# Patient Record
Sex: FEMALE | Race: WHITE | NOT HISPANIC OR LATINO | Employment: FULL TIME | URBAN - METROPOLITAN AREA
[De-identification: names, ages, dates, MRNs, and addresses within clinical notes are randomized per-mention and may not be internally consistent; named-entity substitution may affect disease eponyms.]

---

## 2023-06-14 LAB
CHLAMYDIA TRACH., AMPLIFIED: NEGATIVE
N. GONORRHEA, AMPLIFIED: NEGATIVE
TRICHOMONAS VAGINALIS: NEGATIVE

## 2023-11-22 ENCOUNTER — APPOINTMENT (OUTPATIENT)
Dept: PRIMARY CARE | Facility: CLINIC | Age: 38
End: 2023-11-22
Payer: COMMERCIAL

## 2023-12-06 ENCOUNTER — LAB (OUTPATIENT)
Dept: LAB | Facility: LAB | Age: 38
End: 2023-12-06
Payer: COMMERCIAL

## 2023-12-06 ENCOUNTER — OFFICE VISIT (OUTPATIENT)
Dept: PRIMARY CARE | Facility: CLINIC | Age: 38
End: 2023-12-06
Payer: COMMERCIAL

## 2023-12-06 VITALS
WEIGHT: 168 LBS | SYSTOLIC BLOOD PRESSURE: 124 MMHG | HEIGHT: 60 IN | RESPIRATION RATE: 16 BRPM | TEMPERATURE: 97.5 F | OXYGEN SATURATION: 98 % | HEART RATE: 90 BPM | BODY MASS INDEX: 32.98 KG/M2 | DIASTOLIC BLOOD PRESSURE: 76 MMHG

## 2023-12-06 DIAGNOSIS — Z12.31 ENCOUNTER FOR SCREENING MAMMOGRAM FOR MALIGNANT NEOPLASM OF BREAST: ICD-10-CM

## 2023-12-06 DIAGNOSIS — Z00.00 ROUTINE GENERAL MEDICAL EXAMINATION AT A HEALTH CARE FACILITY: ICD-10-CM

## 2023-12-06 DIAGNOSIS — K92.1 HEMATOCHEZIA: ICD-10-CM

## 2023-12-06 DIAGNOSIS — K92.1 HEMATOCHEZIA: Primary | ICD-10-CM

## 2023-12-06 LAB
ALBUMIN SERPL BCP-MCNC: 4.3 G/DL (ref 3.4–5)
ALP SERPL-CCNC: 87 U/L (ref 33–110)
ALT SERPL W P-5'-P-CCNC: 17 U/L (ref 7–45)
ANION GAP SERPL CALC-SCNC: 12 MMOL/L (ref 10–20)
AST SERPL W P-5'-P-CCNC: 18 U/L (ref 9–39)
BASOPHILS # BLD AUTO: 0.06 X10*3/UL (ref 0–0.1)
BASOPHILS NFR BLD AUTO: 0.5 %
BILIRUB SERPL-MCNC: 0.5 MG/DL (ref 0–1.2)
BUN SERPL-MCNC: 10 MG/DL (ref 6–23)
CALCIUM SERPL-MCNC: 9.7 MG/DL (ref 8.6–10.3)
CHLORIDE SERPL-SCNC: 103 MMOL/L (ref 98–107)
CHOLEST SERPL-MCNC: 170 MG/DL (ref 0–199)
CHOLESTEROL/HDL RATIO: 3.1
CO2 SERPL-SCNC: 26 MMOL/L (ref 21–32)
CREAT SERPL-MCNC: 0.78 MG/DL (ref 0.5–1.05)
EOSINOPHIL # BLD AUTO: 0.13 X10*3/UL (ref 0–0.7)
EOSINOPHIL NFR BLD AUTO: 1.2 %
ERYTHROCYTE [DISTWIDTH] IN BLOOD BY AUTOMATED COUNT: 12.5 % (ref 11.5–14.5)
EST. AVERAGE GLUCOSE BLD GHB EST-MCNC: 105 MG/DL
GFR SERPL CREATININE-BSD FRML MDRD: >90 ML/MIN/1.73M*2
GLUCOSE SERPL-MCNC: 73 MG/DL (ref 74–99)
HBA1C MFR BLD: 5.3 %
HCT VFR BLD AUTO: 43 % (ref 36–46)
HDLC SERPL-MCNC: 54.3 MG/DL
HGB BLD-MCNC: 14.7 G/DL (ref 12–16)
IMM GRANULOCYTES # BLD AUTO: 0.02 X10*3/UL (ref 0–0.7)
IMM GRANULOCYTES NFR BLD AUTO: 0.2 % (ref 0–0.9)
LDLC SERPL CALC-MCNC: 82 MG/DL
LYMPHOCYTES # BLD AUTO: 3.47 X10*3/UL (ref 1.2–4.8)
LYMPHOCYTES NFR BLD AUTO: 31.4 %
MCH RBC QN AUTO: 30.3 PG (ref 26–34)
MCHC RBC AUTO-ENTMCNC: 34.2 G/DL (ref 32–36)
MCV RBC AUTO: 89 FL (ref 80–100)
MONOCYTES # BLD AUTO: 0.83 X10*3/UL (ref 0.1–1)
MONOCYTES NFR BLD AUTO: 7.5 %
NEUTROPHILS # BLD AUTO: 6.54 X10*3/UL (ref 1.2–7.7)
NEUTROPHILS NFR BLD AUTO: 59.2 %
NON HDL CHOLESTEROL: 116 MG/DL (ref 0–149)
NRBC BLD-RTO: 0 /100 WBCS (ref 0–0)
PLATELET # BLD AUTO: 325 X10*3/UL (ref 150–450)
POTASSIUM SERPL-SCNC: 3.9 MMOL/L (ref 3.5–5.3)
PROT SERPL-MCNC: 7.9 G/DL (ref 6.4–8.2)
RBC # BLD AUTO: 4.85 X10*6/UL (ref 4–5.2)
SODIUM SERPL-SCNC: 137 MMOL/L (ref 136–145)
T4 FREE SERPL-MCNC: 0.94 NG/DL (ref 0.61–1.12)
TRIGL SERPL-MCNC: 171 MG/DL (ref 0–149)
TSH SERPL-ACNC: 2.64 MIU/L (ref 0.44–3.98)
VLDL: 34 MG/DL (ref 0–40)
WBC # BLD AUTO: 11.1 X10*3/UL (ref 4.4–11.3)

## 2023-12-06 PROCEDURE — 36415 COLL VENOUS BLD VENIPUNCTURE: CPT

## 2023-12-06 PROCEDURE — 80061 LIPID PANEL: CPT

## 2023-12-06 PROCEDURE — 84443 ASSAY THYROID STIM HORMONE: CPT

## 2023-12-06 PROCEDURE — 85025 COMPLETE CBC W/AUTO DIFF WBC: CPT

## 2023-12-06 PROCEDURE — 83036 HEMOGLOBIN GLYCOSYLATED A1C: CPT

## 2023-12-06 PROCEDURE — 80053 COMPREHEN METABOLIC PANEL: CPT

## 2023-12-06 PROCEDURE — 84439 ASSAY OF FREE THYROXINE: CPT

## 2023-12-06 PROCEDURE — 99396 PREV VISIT EST AGE 40-64: CPT | Performed by: FAMILY MEDICINE

## 2023-12-06 PROCEDURE — 1036F TOBACCO NON-USER: CPT | Performed by: FAMILY MEDICINE

## 2023-12-06 RX ORDER — NORETHINDRONE ACETATE AND ETHINYL ESTRADIOL, ETHINYL ESTRADIOL AND FERROUS FUMARATE 1MG-10(24)
KIT ORAL
COMMUNITY
Start: 2016-04-22

## 2023-12-06 RX ORDER — MULTIVITAMIN WITH IRON
TABLET ORAL
COMMUNITY

## 2023-12-06 NOTE — PROGRESS NOTES
Subjective   Patient ID: Vania Jimenez is a 38 y.o. female who presents for Annual Exam and Hematochezia (Some blood on tissue after BM sometimes).  Covid vax: x 2  Flu: UTD  15 and 12 yo kids     Mammogram: scheduled  Pap: 6/2023 (-)  Lmp: ocp  HPI  There is no problem list on file for this patient.    Just returned from [place]/[facility]-cruise    Past Surgical History:   Procedure Laterality Date    CERVICAL BIOPSY  W/ LOOP ELECTRODE EXCISION  1999       Review of Systems no sz mi or cva    This patient has   NO history of recent Covid nor flu symptoms,  NO Fever nor chills,  NO Chest pain, shortness of breath nor paroxysmal nocturnal dyspnea,  NO Nausea, vomiting, nor diarrhea,  NO Hematochezia nor melena,  NO Dysuria, hematuria, nor new incontinence issues  NO new severe headaches nor neurological complaints,  NO new issues with anxiety nor depression nor new psychiatric complaints,  NO suicidal nor homicidal ideations.     OBJECTIVE:  /76   Pulse 90   Temp 36.4 °C (97.5 °F) (Temporal)   Resp 16   Ht 1.524 m (5')   Wt 76.2 kg (168 lb)   LMP  (LMP Unknown) Comment: ocp  SpO2 98%   BMI 32.81 kg/m²      General:  alert, oriented, no acute distress.  No obvious skin rashes noted.   No gait disturbance noted.    Mood is pleasant, not tearful, no signs of emotional distress.  Not appearing intoxicated or altered.   No voiced delusions,   Normal, appropriate behavior.    HEENT: Normocephalic, atraumatic,   Pupils round, reactive to light  Extraocular motions intact and wnl  Tympanic membranes normal    Neck: no nuchal rigidity  No masses palpable.  No carotid bruits.  No thyromegaly.    Respiratory: Equal breath sounds  No wheezes,    rales,    nor rhonchi  No respiratory distress.    Heart: Regular rate and rhythm, no    murmurs  no rubs/gallops    Abdomen: no masses palpable, nontender, no rebound nor guarding.    Extremities: NO cyanosis noted, no clubbing.   No edema noted.  2+dorsalis pedis  pulses.    Normal-not antalgic, steady gait.  Small hemorrhoid seen no active bleeding no blood in stool today    No visits with results within 3 Month(s) from this visit.   Latest known visit with results is:   [facility] Encounter on 06/13/2023   Component Date Value Ref Range Status    Pathology Report 06/13/2023    Final                    Value:    Accession #: [id number]     Date of Procedure:  6/13/2023       Pathologist: [facility], Cytology  Date Reported: 6/22/2023  Date Received:  6/13/2023  Submitting Physician: SHARAN CORDOVA  Attending Physician: SHARAN CORDOVA                           FINAL CYTOLOGICAL INTERPRETATION        A.  THINPREP PAP CERVICAL:              Specimen adequacy:       SATISFACTORY FOR EVALUATION.       Quality Indicator: Endocervical/transformation zone component is present.       Quality Indicator: Partially obscuring inflammation.              General Categorization:       NEGATIVE FOR INTRAEPITHELIAL LESION OR MALIGNANCY.                            HIGH RISK HPV TEST RESULT:                       HPV GENOTYPE  16                      NEGATIVE       HPV GENOTYPE  18                      NEGATIVE       HPV GENOTYPE  OTHER             NEGATIVE              Reference Range: Negative                  QC review performed at [facility], [address] [place] [id number]    Testing for high-risk (HR) type of human papilloma virus (HPV) is performed by  the [employer] love HPV Test.  The love HPV Test is a qualitative polymerase chain  reaction that amplifies DNA of HPV16, HPV18 and 12 other high-risk HPV types  (31, 33, 35, 39, 45, 51, 52, 56, 58, 59, 66, and 68) associated with cervical  cancer and its precursor lesions.  A positive result indicates the presence of  HPV DNA due to one or more of the 14 genotypes: 16, 18, 31, 33, 35, 39, 45, 51,  52, 56, 58, 59, 66, and 68. Negative results  indicate HPV DNA concentrations  are undetectable or below the pre-set threshold for detection. False negative  results may be associated with unoptimized sampling. A negative HR HPV result  does not exclude the possibility of future cytologic HSIL or underlying CIN2-3  or cancer.    This test is approved for cervical specimens by  the US Food and Drug  Administration. Results of this test should be interpreted in conjunction                           with  the patient’s Pap test results.  Please refer to ASCCP current guidelines for  the use of HPV DNA testing, result interpretation, and patient management.   The performance of this test was verified by the Molecular Diagnostic  Laboratory at [facility]. The lab is  certified under the Clinical Laboratory Amendments of 1988 (CLIA 88) as  qualified to perform high complexity clinical laboratory testing.    This specimen has been analyzed by the SiastoPrep Imaging System ([employer]  an automated imaging and review system, which assists the laboratory in  evaluating cells on ThinPrep Pap tests. Following automated imaging, selected  fields from every slide were reviewed by a cytotechnologist and/or pathologist.      Electronically Signed Out By [facility],  Cytology//LIZY/JUNE   By the signature on this report, the individual or group listed as making the  Final Interpretation/Diagnosis certifies that they have reviewed this                           case.  Diagnostic interpretation performed at [facility] [address] [place] [address]  Educational Note:  Cervical cytology is a screening procedure primarily for squamous cancers and  precursors and has associated false-negative and false-positive results as  evidenced by published data.  Your patient’s test should be interpreted in this  context, together with patient’s history and clinical findings.  Regular  sampling and follow-up of  unexplained clinical signs and symptoms are  recommended to minimize false negative results.         Clinical History  Date of Last Menstrual Period:     5/26/2023    Other Clinical Conditions:  COTEST HPV(Genotype) except for ASC-H, HSIL, Carcinoma - Include HPV Genotype  testing    Clinical Diagnosis History: Screening for malignant neoplasm of cervix -  (Z12.4)   Source of Specimen  A: THINPREP PAP CERVICAL            [facility]  Department of Pathology   [id number] [place] [address]                                      Assessment/Plan     Problem List Items Addressed This Visit    None  Visit Diagnoses       Hematochezia    -  Primary    Relevant Orders    CBC and Auto Differential    Comprehensive Metabolic Panel    Hemoglobin A1C    Lipid Panel    Thyroid Stimulating Hormone    Thyroxine, Free    Referral to Gastroenterology    Routine general medical examination at a health care facility        Relevant Orders    CBC and Auto Differential    Comprehensive Metabolic Panel    Hemoglobin A1C    Lipid Panel    Thyroid Stimulating Hormone    Thyroxine, Free    Encounter for screening mammogram for malignant neoplasm of breast        Relevant Orders    CBC and Auto Differential    Comprehensive Metabolic Panel    Hemoglobin A1C    Lipid Panel    Thyroid Stimulating Hormone    Thyroxine, Free            Offered full gi workup  Will consider-is safest choice-unless recurs  The patient is aware that results will be forthcoming of ALL planned labs and or tests. If no results are received on my chart or by letter within 1 - 3 weeks, the patient is aware they need to call to obtain results, as this is not usual. Also, if any new conditions arise, or current condition worsens, it is understood that sooner appointment should be made or urgent care/convenient care or emergency room treatment should be sought depending on severity. Otherwise follow up for recheck at regular  intervals as we have discussed, at least yearly.  Any recurrence of bleeding-may need er-def needs workup

## 2023-12-13 ENCOUNTER — APPOINTMENT (OUTPATIENT)
Dept: RADIOLOGY | Facility: CLINIC | Age: 38
End: 2023-12-13

## 2023-12-26 ENCOUNTER — TELEPHONE (OUTPATIENT)
Dept: GASTROENTEROLOGY | Facility: CLINIC | Age: 38
End: 2023-12-26
Payer: COMMERCIAL

## 2023-12-27 ENCOUNTER — APPOINTMENT (OUTPATIENT)
Dept: GASTROENTEROLOGY | Facility: CLINIC | Age: 38
End: 2023-12-27
Payer: COMMERCIAL

## 2023-12-27 ENCOUNTER — APPOINTMENT (OUTPATIENT)
Dept: RADIOLOGY | Facility: HOSPITAL | Age: 38
End: 2023-12-27
Payer: COMMERCIAL

## 2024-01-10 ENCOUNTER — APPOINTMENT (OUTPATIENT)
Dept: GASTROENTEROLOGY | Facility: CLINIC | Age: 39
End: 2024-01-10
Payer: COMMERCIAL

## 2024-01-17 ENCOUNTER — APPOINTMENT (OUTPATIENT)
Dept: RADIOLOGY | Facility: HOSPITAL | Age: 39
End: 2024-01-17
Payer: COMMERCIAL

## 2024-01-17 DIAGNOSIS — Z12.31 SCREENING MAMMOGRAM FOR BREAST CANCER: ICD-10-CM

## 2024-02-28 ENCOUNTER — TELEMEDICINE (OUTPATIENT)
Dept: PRIMARY CARE | Facility: CLINIC | Age: 39
End: 2024-02-28
Payer: COMMERCIAL

## 2024-02-28 ENCOUNTER — LAB REQUISITION (OUTPATIENT)
Dept: LAB | Facility: HOSPITAL | Age: 39
End: 2024-02-28
Payer: COMMERCIAL

## 2024-02-28 DIAGNOSIS — R05.1 ACUTE COUGH: ICD-10-CM

## 2024-02-28 DIAGNOSIS — U07.1 COVID: Primary | ICD-10-CM

## 2024-02-28 LAB — SARS-COV-2 RNA RESP QL NAA+PROBE: DETECTED

## 2024-02-28 PROCEDURE — 99441 PR PHYS/QHP TELEPHONE EVALUATION 5-10 MIN: CPT | Performed by: NURSE PRACTITIONER

## 2024-02-28 PROCEDURE — 87635 SARS-COV-2 COVID-19 AMP PRB: CPT

## 2024-02-28 RX ORDER — METHYLPREDNISOLONE 4 MG/1
TABLET ORAL
Qty: 21 TABLET | Refills: 0 | Status: SHIPPED | OUTPATIENT
Start: 2024-02-28 | End: 2024-03-06

## 2024-02-28 ASSESSMENT — ENCOUNTER SYMPTOMS
LOSS OF SENSATION IN FEET: 0
OCCASIONAL FEELINGS OF UNSTEADINESS: 0
SORE THROAT: 1
HEADACHES: 1
DEPRESSION: 0
SINUS PAIN: 0
MYALGIAS: 0
FEVER: 0
WHEEZING: 0
DIZZINESS: 0
CHILLS: 1
COUGH: 1
PALPITATIONS: 0

## 2024-02-28 ASSESSMENT — PATIENT HEALTH QUESTIONNAIRE - PHQ9
2. FEELING DOWN, DEPRESSED OR HOPELESS: NOT AT ALL
1. LITTLE INTEREST OR PLEASURE IN DOING THINGS: NOT AT ALL
SUM OF ALL RESPONSES TO PHQ9 QUESTIONS 1 AND 2: 0

## 2024-02-28 NOTE — PATIENT INSTRUCTIONS
Today, you were seen for Covid. Your COVID PCR test resulted positive today.     COVID INSTRUCTIONS:   When to go to the ER: New onset of shortness of breath, worsening shortness of breath, change in mental status and/or confusion, chest pain, bluish lips or dizziness.    Disinfecting: Make sure to disinfect high touch areas frequently such as tables, doorknobs,  chairs, light switches, remotes, handles, sinks and toilets. Examples of proper disinfectants are:  any Environmental Protection Agency (EPA) registered household disinfectants, diluted bleach  solution and at least 70% alcohol based products.    If COVID test is POSITIVE:  you may return to work or other activities without restrictions in 5 days after your first symptom began AND it has been 1 full day (24 hours) that you have not had a fever.  You should then mask for the following 5 days.     Start using humidifier in your bedroom at night when sleeping. This helps with coughing and congestion. Keep well hydrated along with adequate rest and nutrition. Warm tea with honey is soothing to the throat and helps with coughing. This could also help thin the mucus, reducing the blockage in your sinuses. Elevate your head with an extra pillow while sleeping to prevent mucus from blocking your throat.   You may also do OTC  Robitussin as needed for your cough  Start short burst of steroid as directed  May use Tylenol per package instructions as needed for pain/fever  If symptoms do not improve, see PCP within 1 week, or sooner with any additional concerns.  If you develop worsening symptoms including shortness of breath/trouble breathing, bluish lips or chest pain, proceed to the Emergency department for further treatment

## 2024-02-28 NOTE — PROGRESS NOTES
Subjective   Patient ID: Vania Jimenez is a 38 y.o. female who presents for URI.  Symptoms: coughing, sore throat, SOB, headaches, plugged ear sensation, congestion, sneezing.  Length of symptoms: yesterday  OTC: none  Related information: covid positive today    HPI     38-year-old female presents today complaining of nasal congestion, cough, sore throat, headaches and a plugged ear sensation that started yesterday.  She denies any chest pain or trouble breathing, but does state that her chest feels heavy and she feels mildly short of breath.  She denies smoking or vaping.  No fevers, but was feeling chilled last night.  She denies any history of asthma or heart disease.  Several coworkers have tested positive for COVID.  She has not tried taking anything over-the-counter for symptom relief.  She did take a COVID test today which resulted positive.    Review of Systems   Constitutional:  Positive for chills. Negative for fever.   HENT:  Positive for congestion and sore throat. Negative for ear pain and sinus pain.    Respiratory:  Positive for cough. Negative for wheezing.    Cardiovascular:  Negative for chest pain and palpitations.   Musculoskeletal:  Negative for myalgias.   Skin:  Negative for rash.   Neurological:  Positive for headaches. Negative for dizziness.       Objective   There were no vitals taken for this visit.  No vitals taken with virtual visit-phone encounter only    Physical Exam no physical exam done with virtual visit-phone encounter only.  Patient is able to speak in complete sentences, she does not appear short of breath    Assessment/Plan   Problem List Items Addressed This Visit    None  Visit Diagnoses         Codes    COVID    -  Primary U07.1    Relevant Medications    methylPREDNISolone (Medrol Dospak) 4 mg tablets    Acute cough     R05.1    Relevant Medications    methylPREDNISolone (Medrol Dospak) 4 mg tablets    BMI 32.0-32.9,adult     Z68.32        Positive COVID: Lab from  earlier today reviewed +COVID.   Increase rest and fluids. Medrol dose alethea as directed. Discussed current CDC isolation guidelines.   If developing any worsening symptoms, chest pain, trouble breathing, educated to proceed to emergency department or call 911.

## 2024-03-13 ENCOUNTER — HOSPITAL ENCOUNTER (OUTPATIENT)
Dept: RADIOLOGY | Facility: HOSPITAL | Age: 39
Discharge: HOME | End: 2024-03-13
Payer: COMMERCIAL

## 2024-03-13 VITALS — WEIGHT: 165 LBS | HEIGHT: 60 IN | BODY MASS INDEX: 32.39 KG/M2

## 2024-03-13 DIAGNOSIS — Z12.39 ENCOUNTER FOR OTHER SCREENING FOR MALIGNANT NEOPLASM OF BREAST: ICD-10-CM

## 2024-03-13 PROCEDURE — 77067 SCR MAMMO BI INCL CAD: CPT | Mod: BILATERAL PROCEDURE | Performed by: RADIOLOGY

## 2024-03-13 PROCEDURE — 77063 BREAST TOMOSYNTHESIS BI: CPT | Mod: BILATERAL PROCEDURE | Performed by: RADIOLOGY

## 2024-03-13 PROCEDURE — 77067 SCR MAMMO BI INCL CAD: CPT

## 2024-03-14 ENCOUNTER — TELEPHONE (OUTPATIENT)
Dept: GASTROENTEROLOGY | Facility: CLINIC | Age: 39
End: 2024-03-14
Payer: COMMERCIAL

## 2024-03-14 NOTE — TELEPHONE ENCOUNTER
LVM advising pt that appt on 4/3 will be in [place] d/t template change. Left address for [place] in message if she would like to keep appt, left my direct line if she wished to r/s to [place].

## 2024-03-22 PROBLEM — R53.83 FATIGUE: Status: ACTIVE | Noted: 2024-03-22

## 2024-03-22 PROBLEM — L25.9 CONTACT DERMATITIS: Status: ACTIVE | Noted: 2024-03-22

## 2024-03-22 PROBLEM — N39.0 ACUTE URINARY TRACT INFECTION: Status: ACTIVE | Noted: 2024-03-22

## 2024-03-22 PROBLEM — N90.4 DYSTROPHY OF VULVA: Status: ACTIVE | Noted: 2024-03-22

## 2024-03-22 PROBLEM — N92.6 MISSED PERIOD: Status: ACTIVE | Noted: 2024-03-22

## 2024-03-22 PROBLEM — N93.9 ABNORMAL UTERINE BLEEDING: Status: ACTIVE | Noted: 2024-03-22

## 2024-03-22 PROBLEM — R63.5 ABNORMAL WEIGHT GAIN: Status: ACTIVE | Noted: 2024-03-22

## 2024-03-22 PROBLEM — L73.9 FOLLICULITIS: Status: ACTIVE | Noted: 2024-03-22

## 2024-03-22 PROBLEM — K92.1 HEMATOCHEZIA: Status: ACTIVE | Noted: 2023-12-06

## 2024-03-22 PROBLEM — R39.9 LOWER URINARY TRACT SYMPTOMS: Status: ACTIVE | Noted: 2024-03-22

## 2024-03-22 PROBLEM — S05.02XA ABRASION OF LEFT CORNEA: Status: ACTIVE | Noted: 2024-03-22

## 2024-03-22 PROBLEM — L29.9 ITCHING: Status: ACTIVE | Noted: 2024-03-22

## 2024-03-22 PROBLEM — L29.2 PRURITUS OF VULVA: Status: ACTIVE | Noted: 2024-03-22

## 2024-03-22 PROBLEM — F43.21 ADJUSTMENT DISORDER WITH DEPRESSED MOOD: Status: ACTIVE | Noted: 2024-03-22

## 2024-03-22 PROBLEM — M54.50 LOW BACK PAIN: Status: ACTIVE | Noted: 2024-03-22

## 2024-03-22 PROBLEM — N87.9 CERVICAL ATYPIA: Status: ACTIVE | Noted: 2024-03-22

## 2024-03-22 PROBLEM — M22.2X9 PATELLOFEMORAL PAIN SYNDROME: Status: ACTIVE | Noted: 2024-03-22

## 2024-03-22 PROBLEM — J30.9 ALLERGIC RHINITIS: Status: ACTIVE | Noted: 2024-03-22

## 2024-03-22 PROBLEM — L03.811 CELLULITIS OF HEAD EXCEPT FACE: Status: ACTIVE | Noted: 2024-03-22

## 2024-03-22 PROBLEM — S93.439A SPRAIN OF TIBIOFIBULAR LIGAMENT OF ANKLE: Status: ACTIVE | Noted: 2024-03-22

## 2024-03-22 PROBLEM — B37.31 CANDIDIASIS OF VAGINA: Status: ACTIVE | Noted: 2024-03-22

## 2024-03-22 PROBLEM — N64.9 LESION OF NIPPLE: Status: ACTIVE | Noted: 2024-03-22

## 2024-03-22 PROBLEM — M25.579 ANKLE PAIN: Status: ACTIVE | Noted: 2024-03-22

## 2024-04-03 ENCOUNTER — OFFICE VISIT (OUTPATIENT)
Dept: GASTROENTEROLOGY | Facility: CLINIC | Age: 39
End: 2024-04-03
Payer: COMMERCIAL

## 2024-04-03 VITALS
WEIGHT: 168 LBS | OXYGEN SATURATION: 97 % | HEART RATE: 90 BPM | RESPIRATION RATE: 18 BRPM | SYSTOLIC BLOOD PRESSURE: 126 MMHG | DIASTOLIC BLOOD PRESSURE: 89 MMHG | BODY MASS INDEX: 32.98 KG/M2 | HEIGHT: 60 IN

## 2024-04-03 DIAGNOSIS — K21.9 GASTROESOPHAGEAL REFLUX DISEASE WITHOUT ESOPHAGITIS: ICD-10-CM

## 2024-04-03 DIAGNOSIS — K92.1 HEMATOCHEZIA: ICD-10-CM

## 2024-04-03 DIAGNOSIS — R10.10 UPPER ABDOMINAL PAIN: Primary | ICD-10-CM

## 2024-04-03 PROCEDURE — 99203 OFFICE O/P NEW LOW 30 MIN: CPT | Performed by: NURSE PRACTITIONER

## 2024-04-03 PROCEDURE — 1036F TOBACCO NON-USER: CPT | Performed by: NURSE PRACTITIONER

## 2024-04-03 PROCEDURE — 3008F BODY MASS INDEX DOCD: CPT | Performed by: NURSE PRACTITIONER

## 2024-04-03 NOTE — PROGRESS NOTES
Subjective   Patient ID: Vania Jimenez is a 38 y.o. female who presents for Rectal Bleeding (Noticed some bleeding after she came back from cruise around November/December. Only saw it once, describes as a danika red color in toilet. Unsure if this may be a hemorrhoid, states PCP checked and said there may be a small hemorrhoid. Has never had a colonoscopy. Denies constipation, diarrhea.).  HPI  Patient here for complaints of hematochezia that started after a cruise in November, 2023.  Continues to experience bright red blood with wiping and occasionally in the toilet.  No rectal pain.  Weight and appetite are stable.  No diarrhea or constipation.  No straining and feels fully evacuated.  No NSAIDs.  Occasional alcohol.  No smoking.    No family history of CRC.  Maternal niece with IBD.    Experiences upper abdominal pain after eating only on occasion.  Will experience GERD/indigestion on occasion.  No dysphagia or odynophagia.  -> CBC in December, 2023 within normal limits.  Review of Systems   All other systems reviewed and are negative.      Objective   Physical Exam  Vitals reviewed.   Constitutional:       General: She is awake. She is not in acute distress.     Appearance: Normal appearance. She is well-developed. She is not ill-appearing, toxic-appearing or diaphoretic.   HENT:      Head: Normocephalic and atraumatic.   Eyes:      General: No scleral icterus.     Pupils: Pupils are equal, round, and reactive to light.   Cardiovascular:      Rate and Rhythm: Normal rate and regular rhythm.      Heart sounds: Normal heart sounds. No murmur heard.  Pulmonary:      Effort: Pulmonary effort is normal. No respiratory distress.      Breath sounds: Normal breath sounds.   Abdominal:      General: Bowel sounds are normal.      Palpations: Abdomen is soft. There is no hepatomegaly.      Tenderness: There is no abdominal tenderness. There is no guarding or rebound.      Hernia: No hernia is present.   Musculoskeletal:          General: Normal range of motion.      Cervical back: Normal range of motion.      Right lower leg: No edema.      Left lower leg: No edema.   Skin:     General: Skin is warm and dry.      Coloration: Skin is not jaundiced or pale.   Neurological:      General: No focal deficit present.      Mental Status: She is alert and oriented to person, place, and time.      Motor: No weakness.      Gait: Gait normal.   Psychiatric:         Mood and Affect: Mood normal.         Behavior: Behavior is cooperative.         Thought Content: Thought content normal.         Judgment: Judgment normal.         Assessment/Plan   Diagnoses and all orders for this visit:  Upper abdominal pain  Experiences upper abdominal pain after eating only on occasion, she can trial Pepcid or FD guard as needed.  Monitor symptoms, report any changes or worsening of symptoms.  Hematochezia  -     Colonoscopy Diagnostic; the procedure was discussed at length, including all possible risks.  Patient to follow-up after her procedure to review results.  DDx: Internal hemorrhoids, bleeding polyp, proctitis, ischemic colitis, viral?  Gastroesophageal reflux disease without esophagitis  -Antireflux lifestyle recommended  -Trial Pepcid as needed         ARIEL Meyers-CNP 04/03/24 11:04 AM

## 2024-04-03 NOTE — PATIENT INSTRUCTIONS
Thank you for seeing us in clinic today!     In summary, please do the following:  We will schedule you for your Colonoscopy   .      We will see you again after your procedure.    If you have any questions or concerns, please call the office at [phone]

## 2024-05-08 ENCOUNTER — ANESTHESIA EVENT (OUTPATIENT)
Dept: GASTROENTEROLOGY | Facility: EXTERNAL LOCATION | Age: 39
End: 2024-05-08

## 2024-05-15 ENCOUNTER — ANESTHESIA (OUTPATIENT)
Dept: GASTROENTEROLOGY | Facility: EXTERNAL LOCATION | Age: 39
End: 2024-05-15

## 2024-05-15 ENCOUNTER — HOSPITAL ENCOUNTER (OUTPATIENT)
Dept: GASTROENTEROLOGY | Facility: EXTERNAL LOCATION | Age: 39
Discharge: HOME | End: 2024-05-15
Payer: COMMERCIAL

## 2024-05-15 VITALS
HEIGHT: 60 IN | DIASTOLIC BLOOD PRESSURE: 83 MMHG | OXYGEN SATURATION: 98 % | SYSTOLIC BLOOD PRESSURE: 111 MMHG | TEMPERATURE: 97.3 F | WEIGHT: 165 LBS | BODY MASS INDEX: 32.39 KG/M2 | HEART RATE: 66 BPM | RESPIRATION RATE: 11 BRPM

## 2024-05-15 DIAGNOSIS — K92.1 HEMATOCHEZIA: ICD-10-CM

## 2024-05-15 LAB — PREGNANCY TEST URINE, POC: NEGATIVE

## 2024-05-15 PROCEDURE — 81025 URINE PREGNANCY TEST: CPT | Performed by: STUDENT IN AN ORGANIZED HEALTH CARE EDUCATION/TRAINING PROGRAM

## 2024-05-15 PROCEDURE — 45385 COLONOSCOPY W/LESION REMOVAL: CPT | Performed by: STUDENT IN AN ORGANIZED HEALTH CARE EDUCATION/TRAINING PROGRAM

## 2024-05-15 PROCEDURE — 88305 TISSUE EXAM BY PATHOLOGIST: CPT

## 2024-05-15 PROCEDURE — 88305 TISSUE EXAM BY PATHOLOGIST: CPT | Performed by: PATHOLOGY

## 2024-05-15 RX ORDER — SODIUM CHLORIDE 9 MG/ML
20 INJECTION, SOLUTION INTRAVENOUS CONTINUOUS
Status: DISCONTINUED | OUTPATIENT
Start: 2024-05-15 | End: 2024-05-16 | Stop reason: HOSPADM

## 2024-05-15 RX ORDER — PROPOFOL 10 MG/ML
INJECTION, EMULSION INTRAVENOUS AS NEEDED
Status: DISCONTINUED | OUTPATIENT
Start: 2024-05-15 | End: 2024-05-15

## 2024-05-15 RX ADMIN — PROPOFOL 50 MG: 10 INJECTION, EMULSION INTRAVENOUS at 09:02

## 2024-05-15 RX ADMIN — SODIUM CHLORIDE: 9 INJECTION, SOLUTION INTRAVENOUS at 08:50

## 2024-05-15 RX ADMIN — PROPOFOL 20 MG: 10 INJECTION, EMULSION INTRAVENOUS at 08:59

## 2024-05-15 RX ADMIN — PROPOFOL 80 MG: 10 INJECTION, EMULSION INTRAVENOUS at 08:56

## 2024-05-15 RX ADMIN — PROPOFOL 100 MG: 10 INJECTION, EMULSION INTRAVENOUS at 08:53

## 2024-05-15 SDOH — HEALTH STABILITY: MENTAL HEALTH: CURRENT SMOKER: 0

## 2024-05-15 ASSESSMENT — PAIN SCALES - GENERAL
PAINLEVEL_OUTOF10: 0 - NO PAIN

## 2024-05-15 ASSESSMENT — COLUMBIA-SUICIDE SEVERITY RATING SCALE - C-SSRS
2. HAVE YOU ACTUALLY HAD ANY THOUGHTS OF KILLING YOURSELF?: NO
6. HAVE YOU EVER DONE ANYTHING, STARTED TO DO ANYTHING, OR PREPARED TO DO ANYTHING TO END YOUR LIFE?: NO
1. IN THE PAST MONTH, HAVE YOU WISHED YOU WERE DEAD OR WISHED YOU COULD GO TO SLEEP AND NOT WAKE UP?: NO

## 2024-05-15 ASSESSMENT — PAIN - FUNCTIONAL ASSESSMENT
PAIN_FUNCTIONAL_ASSESSMENT: 0-10

## 2024-05-15 NOTE — ANESTHESIA PREPROCEDURE EVALUATION
Patient: Vania Jimenez    Procedure Information       Date/Time: 05/15/24 0850    Scheduled providers: Yomi Courtney DO    Procedure: COLONOSCOPY    Location: [facility]            Relevant Problems   Anesthesia   (+) Abrasion of left cornea      /Renal   (+) Acute urinary tract infection   (+) Calculus of kidney      ID   (+) Acute urinary tract infection   (+) Candidiasis of vagina      GYN   (+) Abnormal uterine bleeding       Clinical information reviewed:   Tobacco  Allergies  Meds   Med Hx  Surg Hx  OB Status  Fam Hx  Soc   Hx        NPO Detail:  NPO/Void Status  Date of Last Liquid: 05/15/24  Time of Last Liquid: 0230  Date of Last Solid: 05/14/24  Time of Last Solid: 0700  Last Intake Type: Clear fluids         Physical Exam    Airway  Mallampati: II  TM distance: >3 FB  Neck ROM: full     Cardiovascular - normal exam     Dental - normal exam     Pulmonary - normal exam     Abdominal - normal exam         Anesthesia Plan    History of general anesthesia?: yes  History of complications of general anesthesia?: no    ASA 2     MAC     The patient is not a current smoker.  Patient did not smoke on day of procedure.    intravenous induction   Anesthetic plan and risks discussed with patient.  Use of blood products discussed with patient who consented to blood products.    Plan discussed with CRNA.

## 2024-05-15 NOTE — H&P
Outpatient NR Procedure H&P    Patient Profile  Name Vania Jimenez  Date of Birth 1985  MRN [id number]  PCP Nohelia Lombardi        Diagnosis: Rectal bleeding.  Procedure(s):  Colonoscopy.    Allergies  No Known Allergies    Past Medical History   Past Medical History:   Diagnosis Date    Pap smear abnormality of cervix with ASCUS favoring benign 2011    Pap test, as part of routine gynecological examination 06/2023    wnl, hpv neg       Medication Reviewed - yes  Prior to Admission medications    Medication Sig Start Date End Date Taking? Authorizing Provider   Lo Loestrin Fe 1 mg-10 mcg (24)/10 mcg (2) tablet Take by mouth. 4/22/16  Yes Historical Provider, MD   multivitamin (Multiple Vitamins) tablet Take by mouth.   Yes Historical Provider, MD       Physical Exam  Vitals:    05/15/24 0825   BP: (!) 146/96   Pulse: 90   Resp: 12   Temp: 36.7 °C (98.1 °F)   SpO2: 99%      Weight   Vitals:    05/15/24 0825   Weight: 74.8 kg (165 lb)     BMI Body mass index is 32.22 kg/m².    General: A&Ox3, NAD.  HEENT: AT/NC.   CV: RRR. No murmur.  Resp: CTA bilaterally. No wheezing, rhonchi or rales.   GI: Soft, NT/ND.   Extrem: No edema.  Skin: No Jaundice.   Neuro: No focal deficits.   Psych: Normal mood and affect.        Oropharyngeal Classification II (hard and soft palate, upper portion of tonsils and uvula visible)  ASA PS Classification 2  Sedation Plan: Deep Sedation.  Procedure Plan - pre-procedural (re)assesment completed by physician:  discharge/transfer patient when discharge criteria met    Yomi Courtney DO  5/15/2024 8:50 AM

## 2024-05-15 NOTE — DISCHARGE INSTRUCTIONS
Patient Instructions Post Procedure      The anesthetics, sedatives or narcotics which were given to you today will be acting in your body for the next 24 hours, so you might feel a little sleepy or groggy.  This feeling should slowly wear off. Carefully read and follow the instructions.     You received sedation today:  - Do not drive or operate any machinery or power tools of any kind.   - No alcoholic beverages today, not even beer or wine.  - Do not make any important decisions or sign any legal documents.  - No over the counter medications that contain alcohol or that may cause drowsiness.    While it is common to experience mild to moderate abdominal distention, gas, or belching after your procedure, if any of these symptoms occur following discharge from the GI Lab or within one week of having your procedure, call the [facility] to be advised whether a visit to your nearest Urgent Care or Emergency Department is indicated.  Take this paper with you if you go.   - If you develop an allergic reaction to the medications that were given during your procedure such as difficulty breathing, rash, hives, severe nausea, vomiting or lightheadedness.  - If you experience chest pain, shortness of breath, severe abdominal pain, fevers and chills.  -If you develop signs and symptoms of bleeding such as blood in your spit, if your stools turn black, tarry, or bloody  - If you have not urinated within 8 hours following your procedure.  - If your IV site becomes painful, red, inflamed, or looks infected.    If you received a biopsy/polypectomy/sphincterotomy the following instructions apply below:  __ Do not use Aspirin containing products, non-steroidal medications or anti-coagulants for one week following your procedure. (Examples of these types of medications are: Advil, Arthrotec, Aleve, Coumadin, Ecotrin, Heparin, Ibuprofen, Indocin, Motrin, Naprosyn, Nuprin, Plavix, Vioxx, and Voltarin, or their generic  forms.  This list is not all-inclusive.  Check with your physician or pharmacist before resuming medications.)   __ Eat a soft diet today.  Avoid foods that are poorly digested for the next 24 hours.  These foods would include: nuts, beans, lettuce, red meats, and fried foods. Start with liquids and advance your diet as tolerated, gradually work up to eating solids.   __ Do not have a Barium Study or Enema for one week.    Your physician recommends the additional following instructions:    -You have a contact number available for emergencies. The signs and symptoms of potential delayed complications were discussed with you. You may return to normal activities tomorrow.  -Resume your previous diet or other if specified.  -Continue your present medications.   -We are waiting for your pathology results, if applicable.  -The findings and recommendations have been discussed with you and/or family.  - Please see Medication Reconciliation Form for new medication/medications prescribed.     If you experience any problems or have any questions following discharge from the GI Lab, please call: [phone] from 7 am- 4:30 pm.  In the event of an emergency please go to the closest Emergency Department or call Dr. Courtney at [phone]

## 2024-05-15 NOTE — ANESTHESIA POSTPROCEDURE EVALUATION
Patient: Vania Jimenez    Procedure Summary       Date: 05/15/24 Room / Location: [facility]    Anesthesia Start: 0850 Anesthesia Stop: 0912    Procedure: COLONOSCOPY Diagnosis: Hematochezia    Scheduled Providers: Yomi Courtney DO Responsible Provider: RJ Falcon    Anesthesia Type: MAC ASA Status: 2            Anesthesia Type: MAC    Vitals Value Taken Time   /78 05/15/24 0912   Temp 36.3 05/15/24 0912   Pulse 85 05/15/24 0912   Resp 20 05/15/24 0912   SpO2 99 05/15/24 0912       Anesthesia Post Evaluation    Patient location during evaluation: PACU  Patient participation: complete - patient participated  Level of consciousness: awake and alert  Pain management: adequate  Airway patency: patent  Cardiovascular status: acceptable  Respiratory status: acceptable  Hydration status: acceptable  Postoperative Nausea and Vomiting: none        There were no known notable events for this encounter.

## 2024-05-28 LAB
LABORATORY COMMENT REPORT: NORMAL
PATH REPORT.FINAL DX SPEC: NORMAL
PATH REPORT.GROSS SPEC: NORMAL
PATH REPORT.TOTAL CANCER: NORMAL

## 2024-05-29 ENCOUNTER — APPOINTMENT (OUTPATIENT)
Dept: GASTROENTEROLOGY | Facility: CLINIC | Age: 39
End: 2024-05-29
Payer: COMMERCIAL

## 2024-06-11 PROBLEM — N93.9 ABNORMAL UTERINE BLEEDING: Status: RESOLVED | Noted: 2024-03-22 | Resolved: 2024-06-11

## 2024-06-12 ENCOUNTER — APPOINTMENT (OUTPATIENT)
Dept: GASTROENTEROLOGY | Facility: CLINIC | Age: 39
End: 2024-06-12
Payer: COMMERCIAL

## 2024-06-12 DIAGNOSIS — K64.8 INTERNAL HEMORRHOIDS: ICD-10-CM

## 2024-06-12 DIAGNOSIS — K92.1 HEMATOCHEZIA: Primary | ICD-10-CM

## 2024-06-12 DIAGNOSIS — Z83.79 FAMILY HISTORY OF CELIAC DISEASE: ICD-10-CM

## 2024-06-12 DIAGNOSIS — K21.9 GASTROESOPHAGEAL REFLUX DISEASE WITHOUT ESOPHAGITIS: ICD-10-CM

## 2024-06-12 DIAGNOSIS — D12.6 SERRATED ADENOMA OF COLON: ICD-10-CM

## 2024-06-12 PROCEDURE — 3008F BODY MASS INDEX DOCD: CPT | Performed by: NURSE PRACTITIONER

## 2024-06-12 PROCEDURE — 99213 OFFICE O/P EST LOW 20 MIN: CPT | Performed by: NURSE PRACTITIONER

## 2024-06-12 RX ORDER — FAMOTIDINE 40 MG/1
40 TABLET, FILM COATED ORAL NIGHTLY PRN
Qty: 60 TABLET | Refills: 11 | Status: SHIPPED | OUTPATIENT
Start: 2024-06-12 | End: 2024-07-12

## 2024-06-12 NOTE — PROGRESS NOTES
Subjective   Patient ID: Vania Jimenez is a 38 y.o. female who presents for No chief complaint on file..  HPI  LOV 4/3/24:  Upper abdominal pain  Experiences upper abdominal pain after eating only on occasion, she can trial Pepcid or FD guard as needed.  Monitor symptoms, report any changes or worsening of symptoms.  Hematochezia  -     Colonoscopy Diagnostic; the procedure was discussed at length, including all possible risks.  Patient to follow-up after her procedure to review results.  DDx: Internal hemorrhoids, bleeding polyp, proctitis, ischemic colitis, viral?  Gastroesophageal reflux disease without esophagitis  -Antireflux lifestyle recommended  -Trial Pepcid as needed    -> Colonoscopy 5/15/2024:  The terminal ileum appeared normal.  2 subcentimeter polyps in the ascending colon were removed with cold snare.  Hemorrhoids.   +SSAx2 5-year surveillance      Patient following up after her colonoscopy.  No further episode of rectal bleeding.  She denies constipation or diarrhea.  No abdominal pain.  She experiences GERD, has not tried Pepcid.  No dysphagia or odynophagia.  Requesting celiac genetic testing, her daughter was recently diagnosed with celiac disease.  No abdominal bloating or pain.  Weight and appetite are stable.  She offers no GI complaints today other than GERD.  Review of Systems   All other systems reviewed and are negative.      Objective   Physical Exam  Constitutional:       General: She is awake. She is not in acute distress.     Appearance: Normal appearance. She is well-developed. She is not ill-appearing, toxic-appearing or diaphoretic.   HENT:      Head: Normocephalic and atraumatic.   Eyes:      General: No scleral icterus.     Pupils: Pupils are equal, round, and reactive to light.   Pulmonary:      Effort: Pulmonary effort is normal. No respiratory distress.   Musculoskeletal:         General: Normal range of motion.   Skin:     Coloration: Skin is not cyanotic, jaundiced or pale.    Neurological:      General: No focal deficit present.      Mental Status: She is alert and oriented to person, place, and time.   Psychiatric:         Behavior: Behavior is cooperative.         Assessment/Plan   Diagnoses and all orders for this visit:  Hematochezia  -Resolved, status post colonoscopy detailed in the HPI.  Rectal bleeding likely secondary to hemorrhoid bleeding.  Family history of celiac disease  -     Tissue Transglutaminase IgA; Future  -     HLA Typing Celiac Disease; Future  Serrated adenoma of colon  -Surveillance colonoscopy due in 5 years  Gastroesophageal reflux disease without esophagitis  - Anti-reflux lifestyle modification discussed at length   --Avoid spicy acidic based foods   --Limit coffee, tea   --Limit the amount of food during meal time, avoid gorging   --Avoid bedtime snacks and eat meals 3 to 4 hrs before lying down   --Elevate the head of the bed with blocks   --Weight reduction advised and discussed at length   -Follow-up in 6 to 8 weeks  -     famotidine (Pepcid) 40 mg tablet; Take 1 tablet (40 mg) by mouth as needed at bedtime for heartburn.  Internal hemorrhoids  -Keep stools soft and regular  -Avoid straining         ARIEL Meyers-CNP 06/12/24 12:10 PM

## 2024-06-17 ENCOUNTER — LAB (OUTPATIENT)
Dept: LAB | Facility: LAB | Age: 39
End: 2024-06-17
Payer: COMMERCIAL

## 2024-06-17 DIAGNOSIS — Z83.79 FAMILY HISTORY OF CELIAC DISEASE: ICD-10-CM

## 2024-06-17 LAB — TTG IGA SER IA-ACNC: <1 U/ML

## 2024-06-17 PROCEDURE — 36415 COLL VENOUS BLD VENIPUNCTURE: CPT

## 2024-06-17 PROCEDURE — 81383 HLA II TYPING 1 ALLELE HR: CPT

## 2024-06-17 PROCEDURE — 83516 IMMUNOASSAY NONANTIBODY: CPT

## 2024-06-25 LAB
DQA1*05: NEGATIVE
DQB1*02:01: NEGATIVE
DQB1*02:02: NEGATIVE
DQB1*03:02: NEGATIVE
HLA RESULTS: NORMAL

## 2024-07-08 ENCOUNTER — ALLIED HEALTH (OUTPATIENT)
Dept: INTEGRATIVE MEDICINE | Facility: CLINIC | Age: 39
End: 2024-07-08
Payer: COMMERCIAL

## 2024-07-08 DIAGNOSIS — M99.00 SEGMENTAL AND SOMATIC DYSFUNCTION OF HEAD REGION: ICD-10-CM

## 2024-07-08 DIAGNOSIS — M54.2 NECK PAIN: Primary | ICD-10-CM

## 2024-07-08 DIAGNOSIS — M99.03 SEGMENTAL AND SOMATIC DYSFUNCTION OF LUMBAR REGION: ICD-10-CM

## 2024-07-08 DIAGNOSIS — G89.29 CHRONIC BILATERAL LOW BACK PAIN WITHOUT SCIATICA: ICD-10-CM

## 2024-07-08 DIAGNOSIS — M99.01 SEGMENTAL AND SOMATIC DYSFUNCTION OF CERVICAL REGION: ICD-10-CM

## 2024-07-08 DIAGNOSIS — M79.10 MYALGIA: ICD-10-CM

## 2024-07-08 DIAGNOSIS — M99.04 SEGMENTAL AND SOMATIC DYSFUNCTION OF SACRAL REGION: ICD-10-CM

## 2024-07-08 DIAGNOSIS — G44.86 CERVICOGENIC HEADACHE: ICD-10-CM

## 2024-07-08 DIAGNOSIS — M54.50 CHRONIC BILATERAL LOW BACK PAIN WITHOUT SCIATICA: ICD-10-CM

## 2024-07-08 DIAGNOSIS — M79.9 POSTURAL STRAIN: ICD-10-CM

## 2024-07-08 PROCEDURE — 99202 OFFICE O/P NEW SF 15 MIN: CPT | Performed by: CHIROPRACTOR

## 2024-07-08 PROCEDURE — 98941 CHIROPRACT MANJ 3-4 REGIONS: CPT | Performed by: CHIROPRACTOR

## 2024-07-08 PROCEDURE — 97140 MANUAL THERAPY 1/> REGIONS: CPT | Performed by: CHIROPRACTOR

## 2024-07-08 ASSESSMENT — ENCOUNTER SYMPTOMS
NAUSEA: 0
FACIAL ASYMMETRY: 0
AGITATION: 0
ABDOMINAL PAIN: 0
CONFUSION: 0
DIFFICULTY URINATING: 0
SHORTNESS OF BREATH: 0
FEVER: 0
CHILLS: 0

## 2024-07-08 NOTE — PROGRESS NOTES
Subjective   Patient ID: Vania Jimenez is a 38 y.o. female who presents today for the examination and treatment of their lower back, hips,  and headaches pain.     This is visit 1/20 of the calendar year.      Fall risk: None. No falls in the last 6 months.     HPI - Hx lower back a few years. Has gone to Chiropractic before. Lower back and hips. It did help so much. Last treatment almost 1 year ago. Also HA about 1-2x month last 1-2 days. Takes Motrin/Ibuprofen with benefit.  No significant past medical history.  No motor vehicle accidents, work injuries excetra.  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  She cannot recall exactly the distribution of the paresthesias but we will keep this in mind moving forward in the event she would like to work on this as part of her treatment plan.    Patient describes the pain as achiness, stiffness, and dull, and rates the current pain 3 out of 10 (10 being the worst).     Review of Systems   Constitutional:  Negative for chills and fever.   HENT:  Negative for congestion and sneezing.    Eyes:  Negative for visual disturbance.   Respiratory:  Negative for shortness of breath.    Cardiovascular:  Negative for chest pain.   Gastrointestinal:  Negative for abdominal pain and nausea.        Hx ascending colon polyps, +GERD   Endocrine: Negative for polyuria.   Genitourinary:  Negative for difficulty urinating.   Neurological:  Negative for facial asymmetry.   Psychiatric/Behavioral:  Negative for agitation and confusion.      Objective   Observation : normal gait and normal posture    Physical Exam  HENT:      Nose: No congestion.   Pulmonary:      Effort: No respiratory distress.   Musculoskeletal:      Cervical back: Tenderness present. No bony tenderness. No pain with movement. Normal range of motion.      Thoracic back: Tenderness  present. No spasms. Decreased range of motion.      Lumbar back: Tenderness present. No spasms. Negative right straight leg raise test and negative left straight leg raise test.        Back:    Neurological:      Mental Status: She is alert and oriented to person, place, and time.      Sensory: Sensation is intact.      Motor: Motor function is intact.      Coordination: Coordination is intact.      Gait: Gait is intact.      Deep Tendon Reflexes:      Reflex Scores:       Bicep reflexes are 2+ on the right side and 2+ on the left side.       Brachioradialis reflexes are 2+ on the right side and 2+ on the left side.       Patellar reflexes are 2+ on the right side and 2+ on the left side.       Achilles reflexes are 2+ on the right side and 2+ on the left side.  Psychiatric:         Mood and Affect: Mood normal.         Behavior: Behavior normal.       Examination findings (palpation & ROM): Tenderness and hypertonicity throughout the lumbosacral spine including the lumbar paraspinals, upper gluteals, left more than right hip flexors, QL, iliolumbar ligaments.  To lesser degree, hypertonicity was also palpated throughout the suboccipitals, upper trapezius and levator scapula will pronounced on the right side which is her dominant side.  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    Segmental joint dysfunction was identified in the following areas using motion palpation and/or pain provocation assessment:  Cervical: C0-C3  Thoracic: T4-T8  Lumbopelvic: L3-L5/S1, left SI joint    Today's treatment:  Performed spinal manipulation to the regions of segmental dysfunction identified on examination using age-appropriate and injury specific force, and manual diversified technique.     Performed soft tissue manipulation including IASTM (instrument assisted soft tissue mobilization), MFR (Myofacial Release) and IC (ischemic compression) to the hypertonic paraspinal muscles  including lumbar paraspinals, upper gluteals, upper trapezius, levator scapula, suboccipitals, cervical/thoracic paraspinals to patient tolerance.  Additional left hip flexor stretching.  (Start time 4:10 PM, End Time 4:25 PM).    Standing downward dog stretch was also demonstrated to help open up the chest and stretch out the shoulders between patient's.    Treatment Plan:   The patient and I discussed the risks and benefits of chiropractic care. Based on the patient's subjective complaints along with the examination findings, it is advised that a course of chiropractic treatment be initiated. Consent for care was given both written and orally by the patient. The patient tolerated today's treatment with little or no additional discomfort and was instructed to contact the office for questions or concerns.     Treatment Frequency: Will see/treat patient every 2-4 weeks as therapeutic benefit is sustained, then frequency will be reduced to as needed for symptom management or as needed for acute flare-ups/exacerbation.     Please note: Voice-to-text software was used when completing this note.  While the note was proofread, portions may include grammatical errors.  Please contact me with any questions/concerns as it relates to these types of errors.

## 2024-07-18 ENCOUNTER — ALLIED HEALTH (OUTPATIENT)
Dept: INTEGRATIVE MEDICINE | Facility: CLINIC | Age: 39
End: 2024-07-18
Payer: COMMERCIAL

## 2024-07-18 DIAGNOSIS — M99.03 SEGMENTAL AND SOMATIC DYSFUNCTION OF LUMBAR REGION: ICD-10-CM

## 2024-07-18 DIAGNOSIS — G44.86 CERVICOGENIC HEADACHE: ICD-10-CM

## 2024-07-18 DIAGNOSIS — M99.04 SEGMENTAL AND SOMATIC DYSFUNCTION OF SACRAL REGION: ICD-10-CM

## 2024-07-18 DIAGNOSIS — M54.2 NECK PAIN: Primary | ICD-10-CM

## 2024-07-18 DIAGNOSIS — M79.9 POSTURAL STRAIN: ICD-10-CM

## 2024-07-18 DIAGNOSIS — M99.01 SEGMENTAL AND SOMATIC DYSFUNCTION OF CERVICAL REGION: ICD-10-CM

## 2024-07-18 DIAGNOSIS — M54.50 CHRONIC BILATERAL LOW BACK PAIN WITHOUT SCIATICA: ICD-10-CM

## 2024-07-18 DIAGNOSIS — M79.10 MYALGIA: ICD-10-CM

## 2024-07-18 DIAGNOSIS — M99.00 SEGMENTAL AND SOMATIC DYSFUNCTION OF HEAD REGION: ICD-10-CM

## 2024-07-18 DIAGNOSIS — G89.29 CHRONIC BILATERAL LOW BACK PAIN WITHOUT SCIATICA: ICD-10-CM

## 2024-07-18 PROCEDURE — 97140 MANUAL THERAPY 1/> REGIONS: CPT | Performed by: CHIROPRACTOR

## 2024-07-18 PROCEDURE — 98941 CHIROPRACT MANJ 3-4 REGIONS: CPT | Performed by: CHIROPRACTOR

## 2024-07-18 ASSESSMENT — ENCOUNTER SYMPTOMS
FACIAL ASYMMETRY: 0
SHORTNESS OF BREATH: 0
CHILLS: 0
ABDOMINAL PAIN: 0
AGITATION: 0
FEVER: 0
DIFFICULTY URINATING: 0
CONFUSION: 0
NAUSEA: 0

## 2024-07-18 NOTE — PROGRESS NOTES
Subjective   Patient ID: Vania Jimenez is a 38 y.o. female who presents today for the treatment of their lower back, hips,  and headaches pain.     This is visit 2/20 of the calendar year.      Fall risk: None. No falls in the last 6 months.     HPI - Birthday in 1 week.  Out for her birthday dinner this weekend but she has a trip planned in August.  She reports that she tolerated the initial treatment well but the symptoms have remained fairly consistent.  In the past that has taken a couple treatments before things start improving and before she notices significant change.  Today she would like to continue working on the neck upper back and shoulders but also checking the lower back and left greater than right hip/sacrum.    INITIAL INTAKE/SUBJECTIVE 7/8/24: Hx lower back a few years. Has gone to Chiropractic before. Lower back and hips. It did help so much. Last treatment almost 1 year ago. Also HA about 1-2x month last 1-2 days. Takes Motrin/Ibuprofen with benefit.  No significant past medical history.  No motor vehicle accidents, work injuries excetra.  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  She cannot recall exactly the distribution of the paresthesias but we will keep this in mind moving forward in the event she would like to work on this as part of her treatment plan.    Patient describes the pain as achiness, stiffness, and dull, and rates the current pain 3 out of 10 (10 being the worst).     Review of Systems   Constitutional:  Negative for chills and fever.   HENT:  Negative for congestion and sneezing.    Eyes:  Negative for visual disturbance.   Respiratory:  Negative for shortness of breath.    Cardiovascular:  Negative for chest pain.   Gastrointestinal:  Negative for abdominal pain and nausea.        Hx ascending colon polyps, +GERD   Endocrine: Negative for  polyuria.   Genitourinary:  Negative for difficulty urinating.   Neurological:  Negative for facial asymmetry.   Psychiatric/Behavioral:  Negative for agitation and confusion.      Objective   Observation : normal gait and normal posture    Physical Exam  Examination findings (palpation & ROM): Tenderness and hypertonicity throughout the lumbosacral spine including the lumbar paraspinals, upper gluteals, left more than right hip flexors, QL, iliolumbar ligaments.  To lesser degree, hypertonicity was also palpated throughout the suboccipitals, upper trapezius and levator scapula will pronounced on the right side which is her dominant side.  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    Segmental joint dysfunction was identified in the following areas using motion palpation and/or pain provocation assessment:  Cervical: C0-C3  Thoracic: T4-T8  Lumbopelvic: L3-L5/S1, left SI joint    Today's treatment:  Performed spinal manipulation to the regions of segmental dysfunction identified on examination using age-appropriate and injury specific force, and manual diversified technique.     Performed soft tissue manipulation including IASTM (instrument assisted soft tissue mobilization), MFR (Myofacial Release) and IC (ischemic compression) to the hypertonic paraspinal muscles including lumbar paraspinals, upper gluteals, upper trapezius, levator scapula, suboccipitals, cervical/thoracic paraspinals to patient tolerance.  Additional left hip flexor stretching.  (Start time 1:00 PM, End Time 1:15 PM).    Shows seated figure 4 stretch for the L SIJ and piriformis. Continue standing downward dog stretch.    Treatment Plan:   The patient and I discussed the risks and benefits of chiropractic care. Based on the patient's subjective complaints along with the examination findings, it is advised that a course of chiropractic treatment be initiated. Consent for care was given both  written and orally by the patient. The patient tolerated today's treatment with little or no additional discomfort and was instructed to contact the office for questions or concerns.     Treatment Frequency: Will see/treat patient every 2-4 weeks as therapeutic benefit is sustained, then frequency will be reduced to as needed for symptom management or as needed for acute flare-ups/exacerbation.     Please note: Voice-to-text software was used when completing this note.  While the note was proofread, portions may include grammatical errors.  Please contact me with any questions/concerns as it relates to these types of errors.

## 2024-08-15 DIAGNOSIS — Z30.41 ENCOUNTER FOR SURVEILLANCE OF CONTRACEPTIVE PILLS: Primary | ICD-10-CM

## 2024-08-15 RX ORDER — NORETHINDRONE ACETATE AND ETHINYL ESTRADIOL, ETHINYL ESTRADIOL AND FERROUS FUMARATE 1MG-10(24)
1 KIT ORAL DAILY
Qty: 84 TABLET | Refills: 0 | Status: SHIPPED | OUTPATIENT
Start: 2024-08-15

## 2024-08-19 ENCOUNTER — ALLIED HEALTH (OUTPATIENT)
Dept: INTEGRATIVE MEDICINE | Facility: CLINIC | Age: 39
End: 2024-08-19
Payer: COMMERCIAL

## 2024-08-19 DIAGNOSIS — M99.01 SEGMENTAL AND SOMATIC DYSFUNCTION OF CERVICAL REGION: ICD-10-CM

## 2024-08-19 DIAGNOSIS — M53.3 SACRAL BACK PAIN: ICD-10-CM

## 2024-08-19 DIAGNOSIS — M79.9 POSTURAL STRAIN: ICD-10-CM

## 2024-08-19 DIAGNOSIS — M99.03 SEGMENTAL AND SOMATIC DYSFUNCTION OF LUMBAR REGION: ICD-10-CM

## 2024-08-19 DIAGNOSIS — M99.04 SEGMENTAL AND SOMATIC DYSFUNCTION OF SACRAL REGION: ICD-10-CM

## 2024-08-19 DIAGNOSIS — M99.02 SEGMENTAL AND SOMATIC DYSFUNCTION OF THORACIC REGION: ICD-10-CM

## 2024-08-19 DIAGNOSIS — M99.00 SEGMENTAL AND SOMATIC DYSFUNCTION OF HEAD REGION: ICD-10-CM

## 2024-08-19 DIAGNOSIS — M54.2 NECK PAIN: Primary | ICD-10-CM

## 2024-08-19 DIAGNOSIS — G44.86 CERVICOGENIC HEADACHE: ICD-10-CM

## 2024-08-19 DIAGNOSIS — M79.10 MYALGIA: ICD-10-CM

## 2024-08-19 DIAGNOSIS — M54.50 CHRONIC BILATERAL LOW BACK PAIN WITHOUT SCIATICA: ICD-10-CM

## 2024-08-19 DIAGNOSIS — G89.29 CHRONIC BILATERAL LOW BACK PAIN WITHOUT SCIATICA: ICD-10-CM

## 2024-08-19 PROCEDURE — 97140 MANUAL THERAPY 1/> REGIONS: CPT | Performed by: CHIROPRACTOR

## 2024-08-19 PROCEDURE — 98942 CHIROPRACTIC MANJ 5 REGIONS: CPT | Performed by: CHIROPRACTOR

## 2024-08-19 ASSESSMENT — ENCOUNTER SYMPTOMS
CONFUSION: 0
AGITATION: 0
NAUSEA: 0
FEVER: 0
SHORTNESS OF BREATH: 0
ABDOMINAL PAIN: 0
DIFFICULTY URINATING: 0
CHILLS: 0
FACIAL ASYMMETRY: 0

## 2024-08-19 NOTE — PROGRESS NOTES
Subjective   Patient ID: Vania Jimenez is a 39 y.o. female who presents today for the treatment of their lower back, hips and headaches pain.     This is visit 3/20 of the calendar year.      Fall risk: None. No falls in the last 6 months.     HPI - Had a road trip with her boyfriend to [place] (4 hrs each way) and as a result her lower back and neck are very tight. No acute pain. L sacrum is more painful than the R.     Last treatment 7/18/24: Birthday in 1 week.  Out for her birthday dinner this weekend but she has a trip planned in August.  She reports that she tolerated the initial treatment well but the symptoms have remained fairly consistent.  In the past that has taken a couple treatments before things start improving and before she notices significant change.  Today she would like to continue working on the neck upper back and shoulders but also checking the lower back and left greater than right hip/sacrum.    INITIAL INTAKE/SUBJECTIVE 7/8/24: Hx lower back a few years. Has gone to Chiropractic before. Lower back and hips. It did help so much. Last treatment almost 1 year ago. Also HA about 1-2x month last 1-2 days. Takes Motrin/Ibuprofen with benefit.  No significant past medical history.  No motor vehicle accidents, work injuries excetra.  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  She cannot recall exactly the distribution of the paresthesias but we will keep this in mind moving forward in the event she would like to work on this as part of her treatment plan.    Patient describes the pain as achiness, stiffness, and dull, and rates the current pain 3 out of 10 (10 being the worst).     Review of Systems   Constitutional:  Negative for chills and fever.   HENT:  Negative for congestion and sneezing.    Eyes:  Negative for visual disturbance.   Respiratory:  Negative  for shortness of breath.    Cardiovascular:  Negative for chest pain.   Gastrointestinal:  Negative for abdominal pain and nausea.        Hx ascending colon polyps, +GERD   Endocrine: Negative for polyuria.   Genitourinary:  Negative for difficulty urinating.   Neurological:  Negative for facial asymmetry.   Psychiatric/Behavioral:  Negative for agitation and confusion.      Objective   Observation : normal gait and normal posture    Physical Exam  Examination findings (palpation & ROM): Tenderness and hypertonicity throughout the lumbosacral spine including the lumbar paraspinals, upper gluteals, left more than right hip flexors, QL, iliolumbar ligaments.  To lesser degree, hypertonicity was also palpated throughout the suboccipitals, upper trapezius and levator scapula will pronounced on the right side which is her dominant side.      Segmental joint dysfunction was identified in the following areas using motion palpation and/or pain provocation assessment:  Cervical: C0-C3  Thoracic: T4-T8  Lumbopelvic: L3-L5/S1, left SI joint    Today's treatment:  Performed spinal manipulation to the regions of segmental dysfunction identified on examination using age-appropriate and injury specific force, and manual diversified technique.     Performed soft tissue manipulation including MFR (Myofacial Release) and IC (ischemic compression) to the hypertonic paraspinal muscles including lumbar paraspinals, upper gluteals, upper trapezius, levator scapula, suboccipitals, cervical/thoracic paraspinals to patient tolerance.  Additional left hip flexor stretching.  (Start time 12:00 PM, End Time 12:15 PM).    Continue seated figure 4 stretch for the L SIJ and piriformis. Continue standing downward dog stretch.    Treatment Plan:   The patient and I discussed the risks and benefits of chiropractic care. Based on the patient's subjective complaints along with the examination findings, it is advised that a course of chiropractic  treatment be initiated. Consent for care was given both written and orally by the patient. The patient tolerated today's treatment with little or no additional discomfort and was instructed to contact the office for questions or concerns.     Treatment Frequency: Will see/treat patient every 2-4 weeks as therapeutic benefit is sustained, then frequency will be reduced to as needed for symptom management or as needed for acute flare-ups/exacerbation.     Please note: Voice-to-text software was used when completing this note.  While the note was proofread, portions may include grammatical errors.  Please contact me with any questions/concerns as it relates to these types of errors.

## 2024-10-15 NOTE — PROGRESS NOTES
Subjective    Vania Jimenez is a 39 y.o. female who is here for a routine exam. Periods are regular. No intermenstrual bleeding, spotting, or discharge.    Current contraception: OCPs  Last pap: 6/2023 negative, negative HPV  Last mammogram: 6/2023 Negative      Review of Systems  Constitutional: no fever, no chills, no recent weight gain, no recent weight loss and no fatigue.   Eyes: no eye pain, no vision problems and no dryness of the eyes.   ENT: no hearing loss, no nosebleeds and no sinus congestion.   Cardiovascular: no chest pain, no palpitations and no orthopnea.   Respiratory: no shortness of breath, no cough and no wheezing.   Gastrointestinal: no abdominal pain, no constipation, no nausea, no diarrhea and no vomiting.   Genitourinary: no dysuria, no urinary incontinence, no vaginal dryness, no vaginal itching, no dyspareunia, no pelvic pain, no dysmenorrhea, no sexual problems, no change in urinary frequency, no vaginal discharge, no unexplained vaginal bleeding and no lesion/sore.   Musculoskeletal: no back pain, no joint swelling and no leg edema.   Integumentary: no rashes, no skin lesions, no nipple discharge, no breast pain and no breast lump.   Neurological: no headache, no numbness and no dizziness.   Psychiatric: no sleep disturbances, no anxiety and no depression.   Endocrine: no hot flashes, no loss of hair and no hirsutism.   Hematologic/Lymphatic: no swollen glands, no tendency for easy bleeding and no tendency for easy bruising.          Objective   Ht 1.524 m (5')   Wt 77.6 kg (171 lb)   BMI 33.40 kg/m²        General:   Alert and oriented, in no acute distress   Neck: Supple. No visible thyromegaly.    Breast/Axilla: Normal to palpation bilaterally without masses, skin changes, or nipple discharge.    Abdomen: Soft, non-tender, without masses or organomegaly   Vulva: Normal architecture without erythema, masses, or lesions.  Small skin tags left labia majora.   Vagina: Normal mucosa  without lesions, masses, or atrophy. No abnormal vaginal discharge.    Cervix: Normal without masses, lesions, or signs of cervicitis.    Uterus: Normal mobile, non-enlarged uterus    Adnexa: Normal without masses or lesions   Pelvic Floor No POP noted. No high tone pelvic floor    Psych Normal affect. Normal mood.          Assessment/Plan   Annual exam - discussed diet, exercise, self breast awareness, mammogram and pap guidelines.  STI screening.   Vulvar lesions - would like to have removed.

## 2024-10-16 ENCOUNTER — APPOINTMENT (OUTPATIENT)
Dept: OBSTETRICS AND GYNECOLOGY | Facility: CLINIC | Age: 39
End: 2024-10-16
Payer: COMMERCIAL

## 2024-10-16 VITALS — HEIGHT: 60 IN | WEIGHT: 171 LBS | BODY MASS INDEX: 33.57 KG/M2

## 2024-10-16 DIAGNOSIS — Z30.41 ENCOUNTER FOR SURVEILLANCE OF CONTRACEPTIVE PILLS: ICD-10-CM

## 2024-10-16 PROCEDURE — 99386 PREV VISIT NEW AGE 40-64: CPT | Performed by: OBSTETRICS & GYNECOLOGY

## 2024-10-16 PROCEDURE — 87591 N.GONORRHOEAE DNA AMP PROB: CPT

## 2024-10-16 PROCEDURE — 87661 TRICHOMONAS VAGINALIS AMPLIF: CPT

## 2024-10-16 PROCEDURE — 1036F TOBACCO NON-USER: CPT | Performed by: OBSTETRICS & GYNECOLOGY

## 2024-10-16 PROCEDURE — 3008F BODY MASS INDEX DOCD: CPT | Performed by: OBSTETRICS & GYNECOLOGY

## 2024-10-16 PROCEDURE — 87491 CHLMYD TRACH DNA AMP PROBE: CPT

## 2024-10-16 RX ORDER — NORETHINDRONE ACETATE AND ETHINYL ESTRADIOL, ETHINYL ESTRADIOL AND FERROUS FUMARATE 1MG-10(24)
1 KIT ORAL DAILY
Qty: 84 TABLET | Refills: 3 | Status: SHIPPED | OUTPATIENT
Start: 2024-10-16

## 2024-10-17 LAB
C TRACH RRNA SPEC QL NAA+PROBE: NEGATIVE
N GONORRHOEA DNA SPEC QL PROBE+SIG AMP: NEGATIVE
T VAGINALIS RRNA SPEC QL NAA+PROBE: NEGATIVE

## 2024-11-06 ENCOUNTER — OFFICE VISIT (OUTPATIENT)
Dept: PRIMARY CARE | Facility: CLINIC | Age: 39
End: 2024-11-06
Payer: COMMERCIAL

## 2024-11-06 VITALS
BODY MASS INDEX: 34.16 KG/M2 | WEIGHT: 174 LBS | DIASTOLIC BLOOD PRESSURE: 70 MMHG | RESPIRATION RATE: 16 BRPM | SYSTOLIC BLOOD PRESSURE: 110 MMHG | HEIGHT: 60 IN | TEMPERATURE: 98.3 F | OXYGEN SATURATION: 98 % | HEART RATE: 90 BPM

## 2024-11-06 DIAGNOSIS — R09.81 NASAL CONGESTION WITH RHINORRHEA: ICD-10-CM

## 2024-11-06 DIAGNOSIS — R68.89 FLU-LIKE SYMPTOMS: ICD-10-CM

## 2024-11-06 DIAGNOSIS — H60.391 INFECTIOUS OTITIS EXTERNA, RIGHT: ICD-10-CM

## 2024-11-06 DIAGNOSIS — S00.411A ABRASION OF RIGHT EAR CANAL WITH INFECTION, INITIAL ENCOUNTER: ICD-10-CM

## 2024-11-06 DIAGNOSIS — R05.9 COUGH IN ADULT PATIENT: ICD-10-CM

## 2024-11-06 DIAGNOSIS — J34.89 NASAL CONGESTION WITH RHINORRHEA: ICD-10-CM

## 2024-11-06 DIAGNOSIS — H60.391 ABRASION OF RIGHT EAR CANAL WITH INFECTION, INITIAL ENCOUNTER: ICD-10-CM

## 2024-11-06 DIAGNOSIS — J00 NASOPHARYNGITIS: Primary | ICD-10-CM

## 2024-11-06 PROCEDURE — 99214 OFFICE O/P EST MOD 30 MIN: CPT | Performed by: NURSE PRACTITIONER

## 2024-11-06 PROCEDURE — 87636 SARSCOV2 & INF A&B AMP PRB: CPT

## 2024-11-06 PROCEDURE — 87634 RSV DNA/RNA AMP PROBE: CPT

## 2024-11-06 RX ORDER — NEOMYCIN SULFATE, POLYMYXIN B SULFATE, HYDROCORTISONE 3.5; 10000; 1 MG/ML; [USP'U]/ML; MG/ML
2 SOLUTION/ DROPS AURICULAR (OTIC) 4 TIMES DAILY
Qty: 2.8 ML | Refills: 0 | Status: SHIPPED | OUTPATIENT
Start: 2024-11-06 | End: 2024-11-13

## 2024-11-06 RX ORDER — AMOXICILLIN 875 MG/1
875 TABLET, FILM COATED ORAL 2 TIMES DAILY
Qty: 20 TABLET | Refills: 0 | Status: SHIPPED | OUTPATIENT
Start: 2024-11-06 | End: 2024-11-16

## 2024-11-06 RX ORDER — BROMPHENIRAMINE MALEATE, PSEUDOEPHEDRINE HYDROCHLORIDE, AND DEXTROMETHORPHAN HYDROBROMIDE 2; 30; 10 MG/5ML; MG/5ML; MG/5ML
5 SYRUP ORAL 4 TIMES DAILY PRN
Qty: 120 ML | Refills: 1 | Status: SHIPPED | OUTPATIENT
Start: 2024-11-06 | End: 2024-11-16

## 2024-11-06 RX ORDER — BENZONATATE 200 MG/1
200 CAPSULE ORAL 3 TIMES DAILY PRN
Qty: 20 CAPSULE | Refills: 0 | Status: SHIPPED | OUTPATIENT
Start: 2024-11-06 | End: 2024-11-13

## 2024-11-06 ASSESSMENT — ENCOUNTER SYMPTOMS
OCCASIONAL FEELINGS OF UNSTEADINESS: 0
LOSS OF SENSATION IN FEET: 0
DEPRESSION: 0

## 2024-11-06 ASSESSMENT — PATIENT HEALTH QUESTIONNAIRE - PHQ9
SUM OF ALL RESPONSES TO PHQ9 QUESTIONS 1 AND 2: 0
1. LITTLE INTEREST OR PLEASURE IN DOING THINGS: NOT AT ALL
2. FEELING DOWN, DEPRESSED OR HOPELESS: NOT AT ALL

## 2024-11-06 NOTE — PROGRESS NOTES
Subjective   Patient ID: Vania Jimenez is a 39 y.o. female who is with a complaints of:   Symptoms of respiratory tract infection.  Pain and tenderness on the right ear.    HPI  Patient is a 39 y.o. female who CONSULTED AT [facility] today.   Patient is with complaints of nasal congestion, mild nasal discharge, headache, sinus pain, sore throat, post nasal drip, chest congestion, cough, intermittent shortness of breath, fatigue, muscle ache, chills and fever. She denies having any loss of sense of taste, loss of sense of smell, nor diarrhea. Patient states that present condition started about 4 days ago after being exposed to her boyfriend and his son who are both having cough and cold symptoms. she denies shortness of breath at present, chest pain, palpitations, nor edema. she stated that she  tried OTC medications which afforded only slight relief of symptoms. she denies nausea, vomiting, abdominal pain, nor any other symptoms. Patient states she had her COVID vaccine. Patient states she had the flu shot for this season.     Patient is also with complaint of pain and tenderness on the right ear. Patient states condition started about 3 days ago with right ear pain, achy, and progressively worsens. She states she might have scratched his ear canal with the q tips while she was cleaning her ear. This was accompanied by pain when pressing on the earlobe. She denies ear discharge, bleeding from ears, tinnitus, nor vertigo.     Review of Systems  General: no weight loss, generally healthy, (+) fatigue  Head: (+) headache, (+) sinus pain, no vertigo, no injury  Eyes: no diplopia, no tearing, no pain,   Ears: (+) pain and tenderness on the right ear, no tinnitus, no bleeding, no vertigo  Mouth:  no dental difficulties, no gingival bleeding, (+) sore throat, no loss of sense of taste, (+) post nasal drip,   Nose: (+) congestion, (+) mild discharge, no bleeding, no obstruction, no loss of sense of  smell  Neck: no stiffness, no pain, no tenderness, no masses, no bruit  Pulmonary: (+) intermittent dyspnea, no wheezing, no hemoptysis, (+) cough, (+) chest congestion,   Cardiovascular: no chest pain, no palpitations, no syncope, no orthopnea  Gastrointestinal: no change in appetite, no dysphagia, no abdominal pains, no diarrhea, no emesis, no melena  Genito Urinary: no dysuria, no urinary urgency, no nocturia, no incontinence, no change in nature of urine  Musculoskeletal: (+) muscle ache, no joint pain, no limitation of range of motion, no paresthesia, no numbness  Constitutional: (+) fever, (+) chills, no night sweats    Objective   Physical Exam  General: ambulatory, in no acute distress  Head: normocephalic, no lesions, no sinus tenderness  Eyes: pink palpebral conjunctiva, anicteric sclerae, PERRLA, EOM's full  Ears: RIGHT EAR: EAC is with abrasion on the posterior wall, (+) erythema, (+) congestion, no discharge, (+) dry blood on surface of the abrasion, but with no active bleeding; TM intact/ not bulging/ no fluid level; (+) tragal tenderness;;; LEFT EAR: clear external auditory canals, no ear discharge, no bleeding from the ear, tympanic membrane intact  Nose: (+) congested nasal mucosa, (+) yellow mucoid nasal discharge, no bleeding, no obstruction  Throat: (+) erythema, and (+) exudate on posterior pharyngeal wall, no lesion  Neck: supple, no masses, no bruits, no CLADP  Chest: symmetrical chest expansion, no lagging, no retractions, clear breath sounds, no rales, no wheezes    Assessment/Plan   Problem List Items Addressed This Visit    None  Visit Diagnoses         Codes    Nasopharyngitis    -  Primary J00    Relevant Medications    amoxicillin (Amoxil) 875 mg tablet    brompheniramine-pseudoeph-DM 2-30-10 mg/5 mL syrup    Flu-like symptoms     R68.89    Relevant Orders    Sars-CoV-2 PCR    RSV PCR    Influenza A, and B PCR    Cough in adult patient     R05.9    Relevant Medications    amoxicillin  (Amoxil) 875 mg tablet    benzonatate (Tessalon) 200 mg capsule    brompheniramine-pseudoeph-DM 2-30-10 mg/5 mL syrup    Nasal congestion with rhinorrhea     R09.81, J34.89    Relevant Medications    amoxicillin (Amoxil) 875 mg tablet    brompheniramine-pseudoeph-DM 2-30-10 mg/5 mL syrup    Infectious otitis externa, right     H60.391    Relevant Medications    neomycin-polymyxin-HC (Cortisporin) otic solution    Abrasion of right ear canal with infection, initial encounter     S00.411A, H60.391    Relevant Medications    neomycin-polymyxin-HC (Cortisporin) otic solution        Patient for COVID RSV and Flu (PCR) testing.  Specimen collection done by MA   Patient tolerated procedure well  Will inform patient of result    DISCHARGE SUMMARY:   For URI symptoms: Patient was seen and examined. Diagnosis, treatment, treatment options, and possible complications of today's illness discussed and explained to patient. Patient to take medication/s associated with this visit. Patient may also take OTC analgesic/antipyretic if needed for pain/fever. Advised to increase oral fluid intake. Advised steam inhalation if needed to relieve congestion. Advised warm saline gargle if needed to relieve throat discomfort. Advised Listerine antiseptic mouthwash gargle TID. Patient may use Cepacol oral spray as needed to relieve throat discomfort. Patient was advised to discard the old toothbrush and use a new toothbrush beginning on the third of antibiotics. Advised to follow instructions with regards to COVID testing. Advised on social distancing and communicability prevention. Advised to come back if with worsening or persistent symptoms. Patient verbalized understanding of plan of care. Patient to come back in 7 - 10 days if needed for worsening symptoms.     For seasonal allergy symptoms: Patient was seen and examined. Diagnosis, treatment, treatment options, and possible complications of today's illness discussed and explained to  patient. Patient to take medication/s associated with this visit. Advised avoidance of known or suspected allergen. Advised hypoallergenic diet. Advised to come back if with worsening or persistent symptoms. Advised to come back if there is wheezing, change in voice quality, shortness of breath or chest pain. Patient verbalized understanding of plan of care.           SHARAN Morel 11/06/24 11:34 AM

## 2024-11-06 NOTE — PROGRESS NOTES
Subjective   Patient ID: Vania Jimenez is a 39 y.o. female who presents for uri      Symptoms: cough, runny nose, congestion, body aches, low grade fever, sore throat , trouble swallowing  Length of symptoms: 3 days ago  OTC: delsym motrin , sudafed with mild help.  Related information:    HPI     Review of Systems    Objective   BP (!) 134/94 Comment: auto  Pulse (!) 113   Temp 36.8 °C (98.3 °F)   Resp 16   Ht 1.524 m (5')   Wt 78.9 kg (174 lb)   SpO2 98%   BMI 33.98 kg/m²     Physical Exam    Assessment/Plan

## 2024-11-06 NOTE — PATIENT INSTRUCTIONS
DISCHARGE SUMMARY:   For URI symptoms: Patient was seen and examined. Diagnosis, treatment, treatment options, and possible complications of today's illness discussed and explained to patient. Patient to take medication/s associated with this visit. Patient may also take OTC analgesic/antipyretic if needed for pain/fever. Advised to increase oral fluid intake. Advised steam inhalation if needed to relieve congestion. Advised warm saline gargle if needed to relieve throat discomfort. Advised Listerine antiseptic mouthwash gargle TID. Patient may use Cepacol oral spray as needed to relieve throat discomfort. Patient was advised to discard the old toothbrush and use a new toothbrush beginning on the third of antibiotics. Advised to follow instructions with regards to COVID testing. Advised on social distancing and communicability prevention. Advised to come back if with worsening or persistent symptoms. Patient verbalized understanding of plan of care. Patient to come back in 7 - 10 days if needed for worsening symptoms.     For seasonal allergy symptoms: Patient was seen and examined. Diagnosis, treatment, treatment options, and possible complications of today's illness discussed and explained to patient. Patient to take medication/s associated with this visit. Advised avoidance of known or suspected allergen. Advised hypoallergenic diet. Advised to come back if with worsening or persistent symptoms. Advised to come back if there is wheezing, change in voice quality, shortness of breath or chest pain. Patient verbalized understanding of plan of care.

## 2024-11-07 ENCOUNTER — DOCUMENTATION (OUTPATIENT)
Dept: PRIMARY CARE | Facility: CLINIC | Age: 39
End: 2024-11-07
Payer: COMMERCIAL

## 2024-11-07 DIAGNOSIS — Z30.41 ENCOUNTER FOR SURVEILLANCE OF CONTRACEPTIVE PILLS: ICD-10-CM

## 2024-11-07 DIAGNOSIS — J10.1 INFLUENZA A: Primary | ICD-10-CM

## 2024-11-07 LAB
FLUAV RNA RESP QL NAA+PROBE: DETECTED
FLUBV RNA RESP QL NAA+PROBE: NOT DETECTED
RSV RNA RESP QL NAA+PROBE: NOT DETECTED
SARS-COV-2 RNA RESP QL NAA+PROBE: NOT DETECTED

## 2024-11-07 RX ORDER — NORETHINDRONE ACETATE AND ETHINYL ESTRADIOL, ETHINYL ESTRADIOL AND FERROUS FUMARATE 1MG-10(24)
1 KIT ORAL DAILY
Qty: 84 TABLET | Refills: 3 | Status: SHIPPED | OUTPATIENT
Start: 2024-11-07

## 2024-11-07 RX ORDER — OSELTAMIVIR PHOSPHATE 75 MG/1
75 CAPSULE ORAL 2 TIMES DAILY
Qty: 10 CAPSULE | Refills: 0 | Status: SHIPPED | OUTPATIENT
Start: 2024-11-07 | End: 2024-11-12

## 2024-11-07 NOTE — PROGRESS NOTES
I called and talked to patient. We discussed lab results. Patient states symptoms are getting better. She is positive for Influenza A. I prescribed Tamiflu.

## 2024-11-20 ENCOUNTER — APPOINTMENT (OUTPATIENT)
Dept: OBSTETRICS AND GYNECOLOGY | Facility: CLINIC | Age: 39
End: 2024-11-20
Payer: COMMERCIAL

## 2024-11-20 VITALS
BODY MASS INDEX: 33.77 KG/M2 | DIASTOLIC BLOOD PRESSURE: 82 MMHG | HEIGHT: 60 IN | SYSTOLIC BLOOD PRESSURE: 137 MMHG | WEIGHT: 172 LBS

## 2024-11-20 DIAGNOSIS — N90.89 VULVAR LESION: Primary | ICD-10-CM

## 2024-11-20 PROCEDURE — 56606 BIOPSY OF VULVA/PERINEUM: CPT | Performed by: OBSTETRICS & GYNECOLOGY

## 2024-11-20 PROCEDURE — 56605 BIOPSY OF VULVA/PERINEUM: CPT | Performed by: OBSTETRICS & GYNECOLOGY

## 2024-11-20 NOTE — PROGRESS NOTES
Patient ID: Vania Jimenez is a 39 y.o. female.    Biopsy vulva    Date/Time: 11/20/2024 12:14 PM    Performed by: Martin Pelaez MD  Authorized by: Martin Pelaez MD    Procedure Overview:     Procedure type: shave biopsy      # of biopsies taken:  2  Consent:     Consent obtained:  Verbal    Consent given by:  Patient    Risks & benefits discussed with patient: Yes    Indication:     Indications: lesion    Pre-procedure Details:     Prepped with: betadine      Local anesthetic:  Lidocaine 1% WITH epi    Total amount used (mL):  1  Procedure Details:     Location 1: labia majora (L)      Size (mm):  3    Hemostasis: pressure      Location 2: labia majora (L)      Size (mm):  1    Hemostasis: pressure    Post-procedure Details:     Patient tolerance of procedure:  Tolerated well, no immediate complications  Findings:     Impression comment:  Skin tag

## 2024-12-05 PROBLEM — S93.439A SPRAIN OF TIBIOFIBULAR LIGAMENT OF ANKLE: Status: RESOLVED | Noted: 2024-03-22 | Resolved: 2024-12-05

## 2024-12-05 PROBLEM — S05.02XA ABRASION OF LEFT CORNEA: Status: RESOLVED | Noted: 2024-03-22 | Resolved: 2024-12-05

## 2024-12-05 PROBLEM — M25.579 ANKLE PAIN: Status: RESOLVED | Noted: 2024-03-22 | Resolved: 2024-12-05

## 2024-12-05 PROBLEM — R39.9 LOWER URINARY TRACT SYMPTOMS: Status: RESOLVED | Noted: 2024-03-22 | Resolved: 2024-12-05

## 2024-12-05 PROBLEM — N92.6 MISSED PERIOD: Status: RESOLVED | Noted: 2024-03-22 | Resolved: 2024-12-05

## 2024-12-05 PROBLEM — L29.9 ITCHING: Status: RESOLVED | Noted: 2024-03-22 | Resolved: 2024-12-05

## 2024-12-05 PROBLEM — N39.0 ACUTE URINARY TRACT INFECTION: Status: RESOLVED | Noted: 2024-03-22 | Resolved: 2024-12-05

## 2024-12-11 ENCOUNTER — APPOINTMENT (OUTPATIENT)
Dept: PRIMARY CARE | Facility: CLINIC | Age: 39
End: 2024-12-11
Payer: COMMERCIAL

## 2024-12-11 ENCOUNTER — HOSPITAL ENCOUNTER (OUTPATIENT)
Dept: RADIOLOGY | Facility: HOSPITAL | Age: 39
Discharge: HOME | End: 2024-12-11
Payer: COMMERCIAL

## 2024-12-11 ENCOUNTER — LAB (OUTPATIENT)
Dept: LAB | Facility: LAB | Age: 39
End: 2024-12-11
Payer: COMMERCIAL

## 2024-12-11 VITALS
SYSTOLIC BLOOD PRESSURE: 106 MMHG | BODY MASS INDEX: 33.57 KG/M2 | OXYGEN SATURATION: 99 % | TEMPERATURE: 97.2 F | DIASTOLIC BLOOD PRESSURE: 68 MMHG | WEIGHT: 171 LBS | HEIGHT: 60 IN | RESPIRATION RATE: 16 BRPM | HEART RATE: 98 BPM

## 2024-12-11 DIAGNOSIS — Z12.31 ENCOUNTER FOR SCREENING MAMMOGRAM FOR MALIGNANT NEOPLASM OF BREAST: ICD-10-CM

## 2024-12-11 DIAGNOSIS — Z00.00 ROUTINE GENERAL MEDICAL EXAMINATION AT A HEALTH CARE FACILITY: Primary | ICD-10-CM

## 2024-12-11 DIAGNOSIS — Z30.41 ENCOUNTER FOR SURVEILLANCE OF CONTRACEPTIVE PILLS: ICD-10-CM

## 2024-12-11 DIAGNOSIS — R05.8 POST-VIRAL COUGH SYNDROME: ICD-10-CM

## 2024-12-11 DIAGNOSIS — Z00.00 ROUTINE GENERAL MEDICAL EXAMINATION AT A HEALTH CARE FACILITY: ICD-10-CM

## 2024-12-11 LAB
ALBUMIN SERPL BCP-MCNC: 3.9 G/DL (ref 3.4–5)
ALP SERPL-CCNC: 84 U/L (ref 33–110)
ALT SERPL W P-5'-P-CCNC: 14 U/L (ref 7–45)
ANION GAP SERPL CALC-SCNC: 9 MMOL/L (ref 10–20)
AST SERPL W P-5'-P-CCNC: 15 U/L (ref 9–39)
BASOPHILS # BLD AUTO: 0.06 X10*3/UL (ref 0–0.1)
BASOPHILS NFR BLD AUTO: 0.5 %
BILIRUB SERPL-MCNC: 0.4 MG/DL (ref 0–1.2)
BUN SERPL-MCNC: 12 MG/DL (ref 6–23)
CALCIUM SERPL-MCNC: 9.1 MG/DL (ref 8.6–10.3)
CHLORIDE SERPL-SCNC: 105 MMOL/L (ref 98–107)
CHOLEST SERPL-MCNC: 162 MG/DL (ref 0–199)
CHOLESTEROL/HDL RATIO: 3.5
CO2 SERPL-SCNC: 25 MMOL/L (ref 21–32)
CREAT SERPL-MCNC: 0.76 MG/DL (ref 0.5–1.05)
EGFRCR SERPLBLD CKD-EPI 2021: >90 ML/MIN/1.73M*2
EOSINOPHIL # BLD AUTO: 0.18 X10*3/UL (ref 0–0.7)
EOSINOPHIL NFR BLD AUTO: 1.6 %
ERYTHROCYTE [DISTWIDTH] IN BLOOD BY AUTOMATED COUNT: 12.3 % (ref 11.5–14.5)
EST. AVERAGE GLUCOSE BLD GHB EST-MCNC: 103 MG/DL
GLUCOSE SERPL-MCNC: 86 MG/DL (ref 74–99)
HBA1C MFR BLD: 5.2 %
HBV SURFACE AG SERPL QL IA: NONREACTIVE
HCT VFR BLD AUTO: 42.7 % (ref 36–46)
HCV AB SER QL: NONREACTIVE
HDLC SERPL-MCNC: 46.4 MG/DL
HGB BLD-MCNC: 14.2 G/DL (ref 12–16)
HIV 1+2 AB+HIV1 P24 AG SERPL QL IA: NONREACTIVE
IMM GRANULOCYTES # BLD AUTO: 0.03 X10*3/UL (ref 0–0.7)
IMM GRANULOCYTES NFR BLD AUTO: 0.3 % (ref 0–0.9)
LDLC SERPL CALC-MCNC: 75 MG/DL
LYMPHOCYTES # BLD AUTO: 3.3 X10*3/UL (ref 1.2–4.8)
LYMPHOCYTES NFR BLD AUTO: 28.8 %
MCH RBC QN AUTO: 29.5 PG (ref 26–34)
MCHC RBC AUTO-ENTMCNC: 33.3 G/DL (ref 32–36)
MCV RBC AUTO: 89 FL (ref 80–100)
MONOCYTES # BLD AUTO: 0.71 X10*3/UL (ref 0.1–1)
MONOCYTES NFR BLD AUTO: 6.2 %
NEUTROPHILS # BLD AUTO: 7.16 X10*3/UL (ref 1.2–7.7)
NEUTROPHILS NFR BLD AUTO: 62.6 %
NON HDL CHOLESTEROL: 116 MG/DL (ref 0–149)
NRBC BLD-RTO: 0 /100 WBCS (ref 0–0)
PLATELET # BLD AUTO: 313 X10*3/UL (ref 150–450)
POTASSIUM SERPL-SCNC: 4.4 MMOL/L (ref 3.5–5.3)
PROT SERPL-MCNC: 7.7 G/DL (ref 6.4–8.2)
RBC # BLD AUTO: 4.81 X10*6/UL (ref 4–5.2)
SODIUM SERPL-SCNC: 135 MMOL/L (ref 136–145)
T4 FREE SERPL-MCNC: 0.82 NG/DL (ref 0.61–1.12)
TREPONEMA PALLIDUM IGG+IGM AB [PRESENCE] IN SERUM OR PLASMA BY IMMUNOASSAY: NONREACTIVE
TRIGL SERPL-MCNC: 201 MG/DL (ref 0–149)
TSH SERPL-ACNC: 2.47 MIU/L (ref 0.44–3.98)
VLDL: 40 MG/DL (ref 0–40)
WBC # BLD AUTO: 11.4 X10*3/UL (ref 4.4–11.3)

## 2024-12-11 PROCEDURE — 85025 COMPLETE CBC W/AUTO DIFF WBC: CPT

## 2024-12-11 PROCEDURE — 83036 HEMOGLOBIN GLYCOSYLATED A1C: CPT

## 2024-12-11 PROCEDURE — 3008F BODY MASS INDEX DOCD: CPT | Performed by: FAMILY MEDICINE

## 2024-12-11 PROCEDURE — 71046 X-RAY EXAM CHEST 2 VIEWS: CPT | Performed by: RADIOLOGY

## 2024-12-11 PROCEDURE — 87340 HEPATITIS B SURFACE AG IA: CPT

## 2024-12-11 PROCEDURE — 1036F TOBACCO NON-USER: CPT | Performed by: FAMILY MEDICINE

## 2024-12-11 PROCEDURE — 86780 TREPONEMA PALLIDUM: CPT

## 2024-12-11 PROCEDURE — 87389 HIV-1 AG W/HIV-1&-2 AB AG IA: CPT

## 2024-12-11 PROCEDURE — 71046 X-RAY EXAM CHEST 2 VIEWS: CPT

## 2024-12-11 PROCEDURE — 36415 COLL VENOUS BLD VENIPUNCTURE: CPT

## 2024-12-11 PROCEDURE — 80061 LIPID PANEL: CPT

## 2024-12-11 PROCEDURE — 84439 ASSAY OF FREE THYROXINE: CPT

## 2024-12-11 PROCEDURE — 84443 ASSAY THYROID STIM HORMONE: CPT

## 2024-12-11 PROCEDURE — 86803 HEPATITIS C AB TEST: CPT

## 2024-12-11 PROCEDURE — 99396 PREV VISIT EST AGE 40-64: CPT | Performed by: FAMILY MEDICINE

## 2024-12-11 PROCEDURE — 80053 COMPREHEN METABOLIC PANEL: CPT

## 2024-12-11 RX ORDER — AMOXICILLIN 875 MG/1
875 TABLET, FILM COATED ORAL 2 TIMES DAILY
Qty: 20 TABLET | Refills: 0 | Status: SHIPPED | OUTPATIENT
Start: 2024-12-11 | End: 2024-12-21

## 2024-12-11 NOTE — PROGRESS NOTES
Subjective   Patient ID: Vania Jimenez is a 39 y.o. female who presents for Annual Exam and Cough (X 1 month-dx'd with influenza A 11/6--has had the cough since).  Covid vax: x 2  Flu: UTD per pt     Mammogram: 3/2024-ordered  Pap: 6/2023  Lmp: ocp      Did get a flu shot this year    HPI  Patient Active Problem List   Diagnosis    Abnormal weight gain    Adjustment disorder with depressed mood    Allergic rhinitis    Astigmatism    Cervical atypia    Contact dermatitis    Dystrophy of vulva    Fatigue    Folliculitis    Hematochezia    Lesion of nipple    Low back pain    Nevus of choroid    Patellofemoral pain syndrome    Cellulitis of head except face    Tear film insufficiency    Candidiasis of vagina    Pruritus of vulva       Past Surgical History:   Procedure Laterality Date    CERVICAL BIOPSY  W/ LOOP ELECTRODE EXCISION  1999       Review of Systems  This patient has  NO history of seizures/ CAD or CVA    NO history of recent Covid + flu symptoms,  NO Fever nor chills,  NO Chest pain, shortness of breath nor paroxysmal nocturnal dyspnea,  NO Nausea, vomiting, nor diarrhea,  NO Hematochezia nor melena,  NO Dysuria, hematuria, nor new incontinence issues  NO new severe headaches nor neurological complaints,  NO new issues with anxiety nor depression nor new psychiatric complaints,  NO suicidal nor homicidal ideations.   No recent fever-chills or sputum    OBJECTIVE:  /68   Pulse 98   Temp 36.2 °C (97.2 °F) (Temporal)   Resp 16   Ht 1.524 m (5')   Wt 77.6 kg (171 lb)   SpO2 99%   BMI 33.40 kg/m²      General:  alert, oriented, no acute distress.  No obvious skin rashes noted.   No gait disturbance noted.    Mood is pleasant,  no signs of emotional distress.   Not appearing intoxicated or altered.   No voiced delusions,   Normal, appropriate behavior.    HEENT: Normocephalic, atraumatic,   Pupils round, reactive to light  Extraocular motions intact and wnl  Tympanic membranes normal    Neck: no  nuchal rigidity  No masses palpable.  No carotid bruits.  No thyromegaly.    Respiratory: Equal breath sounds  No wheezes,    rales,    nor rhonchi  No respiratory distress.    Heart: Regular rate and rhythm, no    murmurs  no rubs/gallops    Abdomen: no masses palpable, nontender, no rebound nor guarding.    Extremities: NO cyanosis noted, no clubbing.   No edema noted.  2+dorsalis pedis pulses.    Normal-not antalgic, steady gait.    Office Visit on 11/06/2024   Component Date Value Ref Range Status    RSV PCR 11/06/2024 Not Detected  Not Detected Final    Coronavirus 2019, PCR 11/06/2024 Not Detected  Not Detected Final    Flu A Result 11/06/2024 Detected (A)  Not Detected Final    Flu B Result 11/06/2024 Not Detected  Not Detected Final   Office Visit on 10/16/2024   Component Date Value Ref Range Status    Trichomonas Vaginalis 10/16/2024 Negative  Negative, Invalid, TRICH neg Final    Neisseria gonorrhea,Amplified 10/16/2024 Negative  Negative Final    Chlamydia trachomatis, Amplified 10/16/2024 Negative  Negative Final        Assessment/Plan     Problem List Items Addressed This Visit    None  Visit Diagnoses       Routine general medical examination at a health care facility    -  Primary    Relevant Orders    CBC and Auto Differential    Comprehensive Metabolic Panel    Hemoglobin A1C    Lipid Panel    Thyroid Stimulating Hormone    Thyroxine, Free    XR chest 2 views    Encounter for screening mammogram for malignant neoplasm of breast        Relevant Orders    BI mammo bilateral screening tomosynthesis    CBC and Auto Differential    Comprehensive Metabolic Panel    Hemoglobin A1C    Lipid Panel    Thyroid Stimulating Hormone    Thyroxine, Free    XR chest 2 views    Post-viral cough syndrome        Relevant Orders    CBC and Auto Differential    Comprehensive Metabolic Panel    Hemoglobin A1C    Lipid Panel    Thyroid Stimulating Hormone    Thyroxine, Free    XR chest 2 views    BMI 33.0-33.9,adult         Relevant Orders    CBC and Auto Differential    Comprehensive Metabolic Panel    Hemoglobin A1C    Lipid Panel    Thyroid Stimulating Hormone    Thyroxine, Free    XR chest 2 views            Follow up at next scheduled visit -as planned    Empiric abtc  The patient is aware that results will be forthcoming of ALL planned labs and or tests. If no results are received on my chart or by letter within 1 - 3 weeks, the patient is aware they need to call to obtain results, as this is not usual. Also, if any new conditions arise, or current condition worsens, it is understood that sooner appointment should be made or urgent care/convenient care or emergency room treatment should be sought depending on severity. Otherwise follow up for recheck at regular intervals as we have discussed, at least yearly.      The risks, benefits, and alternatives for the antibiotic prescribed were discussed with patient as well as possible side effects. If ANY signs or symptoms of allergic reaction patient is to discontinue medicine immediately and call us or GO TO ER if tongue swelling/respiratory issues or significant effects.  It has already been determined that the benefits outweigh the risks of an antibiotic for you at this time, unless otherwise indicated.  Antibiotics are usually meant to be taken to completion as advised and it is usual course for symptoms to IMPROVE while taking-if symptoms worsen or new problems arise, we should be notified or medical evaluation should occur. Persistent side effects such as diarrhea especially after antibiotic discontinuation are unusual, and should prompt assessment and evaluation either in office or ER depending on severity. Rash, and/or other symptoms of concern should also cause evaluation.  Complete resolution of original symptoms are expected and we should be informed if this does not occur.  Sinus pressure cont'd green drainage then can take amoxil-empiric given-  (Didn't take abtc prior  when confirmed flu)  If cough persists-appt in 1mo

## 2025-02-04 ENCOUNTER — PHARMACY VISIT (OUTPATIENT)
Dept: PHARMACY | Facility: CLINIC | Age: 40
End: 2025-02-04
Payer: COMMERCIAL

## 2025-02-04 PROCEDURE — RXMED WILLOW AMBULATORY MEDICATION CHARGE

## 2025-04-07 PROCEDURE — RXMED WILLOW AMBULATORY MEDICATION CHARGE

## 2025-04-16 ENCOUNTER — PHARMACY VISIT (OUTPATIENT)
Dept: PHARMACY | Facility: CLINIC | Age: 40
End: 2025-04-16
Payer: COMMERCIAL

## 2025-07-01 ENCOUNTER — CLINICAL SUPPORT (OUTPATIENT)
Dept: MATERNAL FETAL MEDICINE | Facility: CLINIC | Age: 40
End: 2025-07-01
Payer: COMMERCIAL

## 2025-07-01 DIAGNOSIS — R30.0 DYSURIA: Primary | ICD-10-CM

## 2025-07-01 DIAGNOSIS — R30.0 DYSURIA: ICD-10-CM

## 2025-07-01 RX ORDER — NITROFURANTOIN 25; 75 MG/1; MG/1
100 CAPSULE ORAL 2 TIMES DAILY
Qty: 14 CAPSULE | Refills: 0 | Status: SHIPPED | OUTPATIENT
Start: 2025-07-01 | End: 2025-07-08

## 2025-07-03 LAB — BACTERIA UR CULT: NORMAL

## 2025-07-06 ENCOUNTER — ANCILLARY PROCEDURE (OUTPATIENT)
Dept: URGENT CARE | Age: 40
End: 2025-07-06
Payer: COMMERCIAL

## 2025-07-06 ENCOUNTER — OFFICE VISIT (OUTPATIENT)
Dept: URGENT CARE | Age: 40
End: 2025-07-06
Payer: COMMERCIAL

## 2025-07-06 VITALS
WEIGHT: 171 LBS | OXYGEN SATURATION: 98 % | DIASTOLIC BLOOD PRESSURE: 93 MMHG | SYSTOLIC BLOOD PRESSURE: 135 MMHG | BODY MASS INDEX: 33.4 KG/M2 | HEART RATE: 91 BPM | TEMPERATURE: 98.1 F | RESPIRATION RATE: 19 BRPM

## 2025-07-06 DIAGNOSIS — T14.90XA INJURY: ICD-10-CM

## 2025-07-06 DIAGNOSIS — M79.676 PAIN OF FIFTH TOE: Primary | ICD-10-CM

## 2025-07-06 PROCEDURE — 99203 OFFICE O/P NEW LOW 30 MIN: CPT | Performed by: NURSE PRACTITIONER

## 2025-07-06 PROCEDURE — 1036F TOBACCO NON-USER: CPT | Performed by: NURSE PRACTITIONER

## 2025-07-06 PROCEDURE — 73660 X-RAY EXAM OF TOE(S): CPT | Mod: RIGHT SIDE | Performed by: NURSE PRACTITIONER

## 2025-07-06 ASSESSMENT — ENCOUNTER SYMPTOMS
SORE THROAT: 0
NAUSEA: 0
FEVER: 0
CHILLS: 0
ABDOMINAL PAIN: 0
COUGH: 0
SHORTNESS OF BREATH: 0

## 2025-07-06 NOTE — PROGRESS NOTES
Subjective   Patient ID: Vania Jimenez is a 39 y.o. female. They present today with a chief complaint of Toe Injury (Injured little toe on the right foot last night. Tripped while running, bruising and pain).    History of Present Illness  HPI    Patient presents for toe injury. She was running earlier today and accidentally kicked a corner of concrete and hit her right 5th toe. Toe is bruised and swollen, she has some decreased range of motion. No fever/chills. She has not done or taken anything.    Past Medical History  Allergies as of 07/06/2025    (No Known Allergies)       Prescriptions Prior to Admission[1]     Medical History[2]    Surgical History[3]     reports that she has never smoked. She has never used smokeless tobacco. She reports current alcohol use. She reports that she does not use drugs.    Review of Systems  Review of Systems   Constitutional:  Negative for chills and fever.   HENT:  Negative for congestion, ear pain, postnasal drip and sore throat.    Respiratory:  Negative for cough and shortness of breath.    Cardiovascular:  Negative for chest pain.   Gastrointestinal:  Negative for abdominal pain and nausea.   Musculoskeletal:         Right toe pain, bruising, and swelling                                  Objective    Vitals:    07/06/25 1503   BP: (!) 135/93   Pulse: 91   Resp: 19   Temp: 36.7 °C (98.1 °F)   SpO2: 98%   Weight: 77.6 kg (171 lb)     No LMP recorded. (Menstrual status: OCP).    Physical Exam  Constitutional:       General: She is not in acute distress.     Appearance: Normal appearance. She is not ill-appearing or toxic-appearing.   HENT:      Head: Normocephalic and atraumatic.      Right Ear: Hearing and external ear normal.      Left Ear: Hearing and external ear normal.      Nose: Nose normal.      Mouth/Throat:      Lips: Pink.      Mouth: Mucous membranes are moist.   Cardiovascular:      Rate and Rhythm: Normal rate and regular rhythm.      Heart sounds: Normal heart  sounds.   Pulmonary:      Effort: Pulmonary effort is normal. No respiratory distress.      Breath sounds: Normal breath sounds.   Musculoskeletal:      Right foot: Decreased range of motion. Swelling present. Normal pulse.      Comments: Right 5th toe with ecchymosis, edema   Skin:     General: Skin is warm and dry.   Neurological:      General: No focal deficit present.      Mental Status: She is alert.   Psychiatric:         Attention and Perception: Attention normal.         Mood and Affect: Mood normal.         Speech: Speech normal.         Behavior: Behavior normal.         Procedures    Point of Care Test & Imaging Results from this visit  No results found for this visit on 07/06/25.   Imaging  XR toe right 2+ views  Result Date: 7/6/2025  No evidence of fracture. Soft tissue swelling.   MACRO: None.   Signed by: Lexus Kam 7/6/2025 3:49 PM Dictation workstation:   [id number]      Cardiology, Vascular, and Other Imaging  No other imaging results found for the past 2 days      Diagnostic study results (if any) were reviewed by SHARAN Kent.    Assessment/Plan   Allergies, medications, history, and pertinent labs/EKGs/Imaging reviewed by SHARAN Kent.     Medical Decision Making  XR was negative for fracture. Ortho referral if needed.  At time of discharge patient was clinically well-appearing and HDS for outpatient management. She was educated regarding diagnosis, supportive care, OTC and Rx medications. She was given the opportunity to ask questions prior to discharge.  They verbalized understanding of my discussion of the plans for treatment, expected course, indications to return to  or seek further evaluation in ED, and the need for timely follow up as directed.         Orders and Diagnoses  Diagnoses and all orders for this visit:  Pain of fifth toe      Medical Admin Record      Patient disposition: Home    Electronically signed by SHARAN Kent  6:29 PM            [1] (Not in a hospital admission)   [2]   Past Medical History:  Diagnosis Date    Calculus of kidney 01/17/2008    History of mammogram 03/13/2024    cat 1    Pap smear abnormality of cervix with ASCUS favoring benign 2011    Pap test, as part of routine gynecological examination 06/2023    wnl, hpv neg   [3]   Past Surgical History:  Procedure Laterality Date    CERVICAL BIOPSY  W/ LOOP ELECTRODE EXCISION  1999    COLONOSCOPY  2024

## 2025-07-08 ENCOUNTER — HOSPITAL ENCOUNTER (OUTPATIENT)
Dept: RADIOLOGY | Facility: HOSPITAL | Age: 40
Discharge: HOME | End: 2025-07-08
Payer: COMMERCIAL

## 2025-07-08 DIAGNOSIS — S33.5XXA SPRAIN OF LIGAMENTS OF LUMBAR SPINE, INITIAL ENCOUNTER: ICD-10-CM

## 2025-07-08 PROCEDURE — 73521 X-RAY EXAM HIPS BI 2 VIEWS: CPT

## 2025-07-08 PROCEDURE — 73523 X-RAY EXAM HIPS BI 5/> VIEWS: CPT | Mod: BILATERAL PROCEDURE | Performed by: RADIOLOGY

## 2025-07-17 ENCOUNTER — PHARMACY VISIT (OUTPATIENT)
Dept: PHARMACY | Facility: CLINIC | Age: 40
End: 2025-07-17
Payer: COMMERCIAL

## 2025-07-17 ENCOUNTER — OFFICE VISIT (OUTPATIENT)
Dept: PRIMARY CARE | Facility: CLINIC | Age: 40
End: 2025-07-17
Payer: COMMERCIAL

## 2025-07-17 VITALS
OXYGEN SATURATION: 98 % | DIASTOLIC BLOOD PRESSURE: 73 MMHG | BODY MASS INDEX: 33.69 KG/M2 | TEMPERATURE: 97.5 F | SYSTOLIC BLOOD PRESSURE: 118 MMHG | RESPIRATION RATE: 16 BRPM | HEIGHT: 60 IN | HEART RATE: 111 BPM | WEIGHT: 171.6 LBS

## 2025-07-17 DIAGNOSIS — S90.129S: Primary | ICD-10-CM

## 2025-07-17 DIAGNOSIS — Z00.00 ROUTINE GENERAL MEDICAL EXAMINATION AT A HEALTH CARE FACILITY: ICD-10-CM

## 2025-07-17 PROCEDURE — 99213 OFFICE O/P EST LOW 20 MIN: CPT

## 2025-07-17 PROCEDURE — 1036F TOBACCO NON-USER: CPT

## 2025-07-17 PROCEDURE — RXMED WILLOW AMBULATORY MEDICATION CHARGE

## 2025-07-17 PROCEDURE — 3008F BODY MASS INDEX DOCD: CPT

## 2025-07-17 RX ORDER — DICLOFENAC SODIUM 10 MG/G
4 GEL TOPICAL 4 TIMES DAILY
Qty: 100 G | Refills: 1 | Status: SHIPPED | OUTPATIENT
Start: 2025-07-17

## 2025-07-17 RX ORDER — CEPHALEXIN 500 MG/1
500 CAPSULE ORAL 2 TIMES DAILY
Qty: 20 CAPSULE | Refills: 0 | Status: SHIPPED | OUTPATIENT
Start: 2025-07-17 | End: 2025-07-27

## 2025-07-17 RX ORDER — MELOXICAM 15 MG/1
15 TABLET ORAL DAILY
Qty: 90 TABLET | Refills: 1 | Status: CANCELLED | OUTPATIENT
Start: 2025-07-17

## 2025-07-17 NOTE — PROGRESS NOTES
Subjective   Patient ID: Vania Jimenez is a 39 y.o. female who presents for Toe Pain (Injured right pinky toe on 7/5 when jumping into the pool. Went to urgent care on 7/6, they did xray nothing was broken. Right pinky toe hurts can't bend toe, red and swollen. Sometimes it throbs and hurts when she walks. Cannot put on shoes just flip flops. Only has taken motrin and advil ).    HPI   Injured toe.  Patient injured her toe at a pool on 7-5, went to urgent care 7/6 , xray done, no fracture.  Patient cannot get into podiatry until August.  Patient still having some redness, swelling, tenderness.  - Onset: 7/5  - Location: Right foot pinky toe.  - Duration: Improved overall but redness, swelling has not gone down.   - Characteristics: Hurts when walks, cannot bend toe, swollen and red.  Mildly warm to touch and tender.   - Aggravating factors: Wearing shoes.   - Relieving factors: None.   - Treatment: Motrin and ice.     Review of Systems  Constitutional: Patient denies any fever, chills, appetite change, fatigue, diaphoresis, or unexpected weight change.  Musculoskeletal: Right foot pinky toe redness, swelling, tenderness.  Neurology: Patient denies any new motor or sensory losses, numbness, tingling, weakness, and incoordination of the extremities, tremors, seizures, dizziness, facial asymmetry, or gait instability.    Objective   /73   Pulse (!) 111   Temp 36.4 °C (97.5 °F) (Temporal)   Resp 16   Ht (!) 1.524 m (5')   Wt 77.8 kg (171 lb 9.6 oz)   SpO2 98%   BMI 33.51 kg/m²     Physical Exam  Constitutional: Awake, alert, and oriented. In no acute distress, no diaphoresis, well-developed, well-nourished. Appears stated age.   Musculoskeletal: Right foot pinky toe erythematous, edematous, mildly warm to touch.  Tenderness on palpation of dorsum side of pinky toe, and outer lateral side of foot.    Assessment/Plan   Diagnoses and all orders for this visit:  Contusion of toe without damage to nail,  unspecified laterality, unspecified toe, sequela  Patient used diclofenac gel on top for pain, patient can take Keflex for possible skin infection due to trauma.  Patient is to keep her podiatry appointment for next month for follow-up.  -     diclofenac sodium (Voltaren) 1 % gel; Apply 4.5 inches (4 g) topically 4 times a day.  -     cephalexin (Keflex) 500 mg capsule; Take 1 capsule (500 mg) by mouth 2 times a day for 10 days.  Routine general medical examination at a health care facility  Labs ordered for future visit  -     CBC and Auto Differential; Future  -     Hemoglobin A1c; Future  -     Lipid panel; Future  -     Tsh With Reflex To Free T4 If Abnormal; Future  BMI 32.0-32.9,adult  -     Comprehensive Metabolic Panel; Future     Follow Up  -Patient is to keep upcoming appointment with PCP for December with labs done prior.  -Patient is aware to reach back out to provider if symptoms do not improve after treatment is complete.   -Patient is aware to go to the emergency room if the patient would develop fever with worsening symptoms, chest pain, shortness of breath.   -The patient and/or caregiver has verbalized understanding of the above treatment plan and have no further questions at this time.     Vania Jimenez- please be aware that any referrals discussed today in this visit should be placed as well as tests/labs ordered should result in prompt scheduling today. If not done today-then a phone call for scheduling is expected in a timely manner (within 2 weeks). If testing is to be done-a result should be available to patient within 2 weeks time unless otherwise specified.  Please reach out if you have not heard results back within a timely manner.    You, the patient or caregiver, are responsible for making sure what was discussed in this visit is actually scheduled and completed. If suboptimal understanding of results of tests or referral reason- a follow up appointment with me or your primary provider  should be made. If above recommended testing/referrals/labs/treatment ect does NOT occur-you are to connect with us for explanation. Patient understands that should they have testing outside  facilities that we may not receive the results and was told to call us if they have not heard from our office within a week after testing.     Please take into consideration, dragon voice recognition dictation software was utilized partially in the preparation of this note, therefore, inaccuracies in spelling, word choice and punctuation may have occurred which were not recognized at the time of signing.

## 2025-08-18 ENCOUNTER — APPOINTMENT (OUTPATIENT)
Facility: CLINIC | Age: 40
End: 2025-08-18
Payer: COMMERCIAL

## 2025-09-23 ENCOUNTER — APPOINTMENT (OUTPATIENT)
Dept: RADIOLOGY | Facility: CLINIC | Age: 40
End: 2025-09-23
Payer: COMMERCIAL

## 2025-12-17 ENCOUNTER — APPOINTMENT (OUTPATIENT)
Dept: PRIMARY CARE | Facility: CLINIC | Age: 40
End: 2025-12-17
Payer: COMMERCIAL